# Patient Record
Sex: FEMALE | Race: WHITE | Employment: OTHER | URBAN - METROPOLITAN AREA
[De-identification: names, ages, dates, MRNs, and addresses within clinical notes are randomized per-mention and may not be internally consistent; named-entity substitution may affect disease eponyms.]

---

## 2017-04-19 ENCOUNTER — ONCOLOGY VISIT (OUTPATIENT)
Dept: ONCOLOGY | Facility: CLINIC | Age: 72
End: 2017-04-19
Attending: INTERNAL MEDICINE
Payer: MEDICARE

## 2017-04-19 VITALS
SYSTOLIC BLOOD PRESSURE: 109 MMHG | DIASTOLIC BLOOD PRESSURE: 70 MMHG | TEMPERATURE: 97 F | RESPIRATION RATE: 12 BRPM | HEART RATE: 98 BPM | OXYGEN SATURATION: 100 %

## 2017-04-19 DIAGNOSIS — C82.99 NODULAR LYMPHOMA OF EXTRANODAL AND/OR SOLID ORGAN SITE (H): Primary | ICD-10-CM

## 2017-04-19 DIAGNOSIS — C82.99 NODULAR LYMPHOMA OF EXTRANODAL AND/OR SOLID ORGAN SITE (H): ICD-10-CM

## 2017-04-19 DIAGNOSIS — J43.9 PULMONARY EMPHYSEMA, UNSPECIFIED EMPHYSEMA TYPE (H): ICD-10-CM

## 2017-04-19 DIAGNOSIS — D63.0 ANEMIA IN NEOPLASTIC DISEASE: ICD-10-CM

## 2017-04-19 LAB
ALBUMIN SERPL-MCNC: 3.8 G/DL (ref 3.4–5)
ALP SERPL-CCNC: 59 U/L (ref 40–150)
ALT SERPL W P-5'-P-CCNC: 12 U/L (ref 0–50)
ANION GAP SERPL CALCULATED.3IONS-SCNC: 3 MMOL/L (ref 3–14)
AST SERPL W P-5'-P-CCNC: 17 U/L (ref 0–45)
BASOPHILS # BLD AUTO: 0 10E9/L (ref 0–0.2)
BASOPHILS NFR BLD AUTO: 0.3 %
BILIRUB SERPL-MCNC: 0.4 MG/DL (ref 0.2–1.3)
BUN SERPL-MCNC: 22 MG/DL (ref 7–30)
CALCIUM SERPL-MCNC: 9.2 MG/DL (ref 8.5–10.1)
CHLORIDE SERPL-SCNC: 95 MMOL/L (ref 94–109)
CO2 SERPL-SCNC: 40 MMOL/L (ref 20–32)
CREAT SERPL-MCNC: 0.76 MG/DL (ref 0.52–1.04)
DIFFERENTIAL METHOD BLD: ABNORMAL
EOSINOPHIL # BLD AUTO: 0.1 10E9/L (ref 0–0.7)
EOSINOPHIL NFR BLD AUTO: 2.1 %
ERYTHROCYTE [DISTWIDTH] IN BLOOD BY AUTOMATED COUNT: 12.1 % (ref 10–15)
GFR SERPL CREATININE-BSD FRML MDRD: 75 ML/MIN/1.7M2
GLUCOSE SERPL-MCNC: 114 MG/DL (ref 70–99)
HCT VFR BLD AUTO: 34.9 % (ref 35–47)
HGB BLD-MCNC: 10.9 G/DL (ref 11.7–15.7)
IMM GRANULOCYTES # BLD: 0 10E9/L (ref 0–0.4)
IMM GRANULOCYTES NFR BLD: 0.3 %
LYMPHOCYTES # BLD AUTO: 0.5 10E9/L (ref 0.8–5.3)
LYMPHOCYTES NFR BLD AUTO: 14.2 %
MCH RBC QN AUTO: 31.9 PG (ref 26.5–33)
MCHC RBC AUTO-ENTMCNC: 31.2 G/DL (ref 31.5–36.5)
MCV RBC AUTO: 102 FL (ref 78–100)
MONOCYTES # BLD AUTO: 0.3 10E9/L (ref 0–1.3)
MONOCYTES NFR BLD AUTO: 7.7 %
NEUTROPHILS # BLD AUTO: 2.9 10E9/L (ref 1.6–8.3)
NEUTROPHILS NFR BLD AUTO: 75.4 %
NRBC # BLD AUTO: 0 10*3/UL
NRBC BLD AUTO-RTO: 0 /100
PLATELET # BLD AUTO: 82 10E9/L (ref 150–450)
POTASSIUM SERPL-SCNC: 4.4 MMOL/L (ref 3.4–5.3)
PROT SERPL-MCNC: 6.6 G/DL (ref 6.8–8.8)
RBC # BLD AUTO: 3.42 10E12/L (ref 3.8–5.2)
SODIUM SERPL-SCNC: 137 MMOL/L (ref 133–144)
URATE SERPL-MCNC: 4.4 MG/DL (ref 2.6–6)
WBC # BLD AUTO: 3.8 10E9/L (ref 4–11)

## 2017-04-19 PROCEDURE — 99214 OFFICE O/P EST MOD 30 MIN: CPT | Mod: ZP | Performed by: INTERNAL MEDICINE

## 2017-04-19 PROCEDURE — 85025 COMPLETE CBC W/AUTO DIFF WBC: CPT | Performed by: INTERNAL MEDICINE

## 2017-04-19 PROCEDURE — 00000402 ZZHCL STATISTIC TOTAL PROTEIN: Performed by: INTERNAL MEDICINE

## 2017-04-19 PROCEDURE — 84165 PROTEIN E-PHORESIS SERUM: CPT | Performed by: INTERNAL MEDICINE

## 2017-04-19 PROCEDURE — 36415 COLL VENOUS BLD VENIPUNCTURE: CPT | Performed by: INTERNAL MEDICINE

## 2017-04-19 PROCEDURE — 80053 COMPREHEN METABOLIC PANEL: CPT | Performed by: INTERNAL MEDICINE

## 2017-04-19 PROCEDURE — 84550 ASSAY OF BLOOD/URIC ACID: CPT | Performed by: INTERNAL MEDICINE

## 2017-04-19 PROCEDURE — 99212 OFFICE O/P EST SF 10 MIN: CPT | Mod: ZF

## 2017-04-19 ASSESSMENT — PAIN SCALES - GENERAL: PAINLEVEL: NO PAIN (0)

## 2017-04-19 NOTE — LETTER
"4/19/2017      RE: Michelle Baker  [address] [address]       HISTORY OF PRESENT ILLNESS:  Michelle Baker returns today in followup of her diagnosis of follicular lymphoma.  The patient is a 72-year-old woman diagnosed with stage IVB disease in 10/2011.  At that time, she had scattered nodes, a large abdominal mass, and displacement plus involvement of the right kidney, musculoskeletal and marrow disease.  She has received a number of therapies including as outlined in my note of 11/16/2016.  Her most recent therapy was with R-bendamustine beginning in 02/2014.  After 4 cycles she had an excellent response per CT scan.  She received a fifth cycle and then treatment was discontinued because of the onset of neutropenia \"attributed to rituximab.\"       Ms. Baker also has significant chronic obstructive pulmonary disease for which she is being seen at home and is on inhalers plus home O2.  Her respiratory status is stable.  She is breathing comfortably most of the time with the O2.  However, she does have occasional \"panic attacks.\" Overall, she is coping quite well.       Ms. Baker was last seen in my clinic on 11/16/2016.  In the interval, she indicates that she has had no significant infections. She has had no fevers or night sweats and her weight is stable.  Her appetite is good and food appeals.  She has no nausea, vomiting or constipation/diarrhea.  She has had no bleediing.      REVIEW OF SYSTEMS:  A 10-point review of systems is otherwise negative.       MEDICATIONS:  Reviewed and no changes according to the patient.      ALLERGIES:  None reported.      PHYSICAL EXAMINATION:   VITAL SIGNS:  Weight - pending, blood pressure 109/70, pulse  and regular, respirations 12-18, temperature 97.0, O2 saturation 100% on inhaled O2.    GENERAL:  The patient is sitting in a wheelchair comfortably.  She is very active and alert.  She is somewhat anxious as usual.    HEENT:  Anicteric.  Oropharynx - no " masses.  Mucosa - moist.  No lesions.   NECK:  No cervical or supraclavicular adenopathy.   CHEST:  Reduced air entry.  No adventitious sounds.  No dullness to percussion.   AXILLAE:  No nodes.   HEART:  Regular rhythm.  Late systolic murmur heard at the apex.  Heart sounds are distant.  Regular rhythm.   ABDOMEN:  Soft.  No detectable organomegaly or mass.  No tenderness.  Bowel sounds present.  No inguinal nodes.   EXTREMITIES:  No edema.   SKIN:  No rashes.       LABORATORY:     Hemoglobin 10.9 grams percent with 3,800 WBCs (75% neutrophils, 14% lymphocytes) and 82,000 platelets.     Electrolytes - normal with the exception of an elevated bicarbonate of 40.  Creatinine 0.76.     Calcium 9.2.  Uric acid 4.4.     Liver enzymes - normal.    Glucose 114 (non-fasting).      IMAGING:  Chest, abdominal and pelvic CT scan - Insert.       ASSESSMENT AND PLAN:     1.  Follicular lymphoma (grade 1), stage IVB - The patient continues to do relatively well after her most recent treatment with rituximab plus bendamustine ending in 06/2014.  On physical examination and by CT scan, she has no evidence of recurrent disease at this time.  We will continue to see the patient at 3-4-month intervals and determine the appropriate interval for reimaging in the future.    2.  Anemia.  The patient's hemoglobin is down slightly from before.  It is macrocytic with an MCV of 102.  It does not appear that she is losing blood and has no suggestion of hemolysis in the past.  We plan to have the CBC repeated in approximately 1 month and will make additional determinations at that time.       Return visit is scheduled for early August.  Laboratory studies are ordered.     Keith Ospina MD  Professor of Medicine  Oncology  [facility]  Office: [phone]  Clinic Fax: [phone]       cc:  Laurie Rivera, EVELYN Ospina MD

## 2017-04-19 NOTE — MR AVS SNAPSHOT
After Visit Summary   4/19/2017    Michelle Baker    MRN: [id number]           Patient Information     Date Of Birth          1945        Visit Information        Provider Department      4/19/2017 4:00 PM Keith Ospina MD [facility]        Today's Diagnoses     Nodular lymphoma of extranodal and/or solid organ site (H)    -  1    Anemia in neoplastic disease          Care Instructions    Blood test at home in about 1 month,        Follow-ups after your visit        Follow-up notes from your care team     Return in about 4 months (around 8/10/2017) for Physical Exam, Lab Work.      Your next 10 appointments already scheduled     Aug 16, 2017 12:30 PM CDT   [facility] Draw with  [facility]   [facility] Lab Draw ([facility])    [address] [place] [id number]   [phone]            Aug 16, 2017  1:00 PM CDT   (Arrive by 12:45 PM)   Return Visit with Keith Ospina MD   [facility] ([facility])    [address] [place] [id number] [phone]              Future tests that were ordered for you today     Open Future Orders        Priority Expected Expires Ordered    Haptoglobin Routine 8/3/2017 4/19/2018 4/19/2017    Ferritin Routine 8/3/2017 4/19/2018 4/19/2017    Direct antiglobulin test Routine 8/3/2017 4/19/2018 4/19/2017    *CBC with platelets differential Routine 8/3/2017 4/19/2018 4/19/2017    Reticulocyte count Routine 8/3/2017 4/19/2018 4/19/2017    Comprehensive metabolic panel Routine 8/3/2017 4/19/2018 4/19/2017    Lactate Dehydrogenase Routine 8/3/2017 4/19/2018 4/19/2017    Protein electrophoresis Routine 8/3/2017 4/19/2018 4/19/2017    Folate Routine 8/3/2017 4/19/2018 4/19/2017    Vitamin B12 Routine 8/3/2017 4/19/2018 4/19/2017            Who to contact     If you have questions or need follow up information about  "today's clinic visit or your schedule please contact [facility] directly at [phone].  Normal or non-critical lab and imaging results will be communicated to you by [email or web address], letter or phone within 4 business days after the clinic has received the results. If you do not hear from us within 7 days, please contact the clinic through [email or web address] or phone. If you have a critical or abnormal lab result, we will notify you by phone as soon as possible.  Submit refill requests through [employer] or call your pharmacy and they will forward the refill request to us. Please allow 3 business days for your refill to be completed.          Additional Information About Your Visit        [email or web address] Information     [employer] lets you send messages to your doctor, view your test results, renew your prescriptions, schedule appointments and more. To sign up, go to [email or web address] . Click on \"Log in\" on the left side of the screen, which will take you to the Welcome page. Then click on \"Sign up Now\" on the right side of the page.     You will be asked to enter the access code listed below, as well as some personal information. Please follow the directions to create your username and password.     Your access code is: [email or web address]  Expires: 2017  6:30 AM     Your access code will  in 90 days. If you need help or a new code, please call your [facility] clinic or [phone].        Care EveryWhere ID     This is your Care EveryWhere ID. This could be used by other organizations to access your [facility] medical records  [id number]        Your Vitals Were     Pulse Temperature Respirations Pulse Oximetry          98 97  F (36.1  C) (Oral) 12 100%         Blood Pressure from Last 3 Encounters:   17 109/70   16 129/82   16 115/73    Weight from Last 3 Encounters:   16 34.7 kg (76 lb 9.6 oz)   16 34.6 kg (76 lb 4.8 oz)   16 35.2 kg (77 lb 8 oz)               Primary Care " Provider Office Phone # Fax #    Shlomo Rivera [phone]       [facility] [address] [facility] [address]        Thank you!     Thank you for choosing [facility]  for your care. Our goal is always to provide you with excellent care. Hearing back from our patients is one way we can continue to improve our services. Please take a few minutes to complete the written survey that you may receive in the mail after your visit with us. Thank you!             Your Updated Medication List - Protect others around you: Learn how to safely use, store and throw away your medicines at www.disposemymeds.org.          This list is accurate as of: 4/19/17  6:55 PM.  Always use your most recent med list.                   Brand Name Dispense Instructions for use    ALBUTEROL IN      Inhale 2 puffs into the lungs 4 times daily       Calcium-Vitamin D 600-200 MG-UNIT Tabs      Take 1 tablet by mouth daily.       PAXIL PO      Take 10 mg by mouth daily       SPIRIVA HANDIHALER IN      Inhale 1 puff into the lungs daily       TYLENOL 500 MG tablet   Generic drug:  acetaminophen      Take 500-1,000 mg by mouth every 6 hours as needed for mild pain Reported on 4/19/2017

## 2017-04-19 NOTE — LETTER
"4/19/2017      RE: Michelle Baker  [address] [address]       HISTORY OF PRESENT ILLNESS:  Michelle Baker returns today in followup of her diagnosis of follicular lymphoma.  The patient is a 72-year-old woman diagnosed with stage IVB disease in 10/2011.  At that time, she had scattered nodes, a large abdominal mass, and displacement plus involvement of the right kidney, musculoskeletal and marrow disease.  She has received a number of therapies including as outlined in my note of 11/16/2016.  Her most recent therapy was with R-bendamustine beginning in 02/2014.  After 4 cycles she had an excellent response per CT scan.  She received a fifth cycle and then treatment was discontinued because of the onset of neutropenia \"attributed to rituximab.\"       Ms. Baker also has significant chronic obstructive pulmonary disease for which she is being seen at home and is on inhalers plus home O2.  Her respiratory status is stable.  She is breathing comfortably most of the time with the O2.  However, she does have occasional \"panic attacks.\" Overall, she is coping quite well.       Ms. Baker was last seen in my clinic on 11/16/2016.  In the interval, she indicates that she has had no significant infections. She has had no fevers or night sweats and her weight is stable.  Her appetite is good and food appeals.  She has no nausea, vomiting or constipation/diarrhea.  She has had no bleediing.      REVIEW OF SYSTEMS:  A 10-point review of systems is otherwise negative.       MEDICATIONS:  Reviewed and no changes according to the patient.      ALLERGIES:  None reported.      PHYSICAL EXAMINATION:   VITAL SIGNS:  Weight - pending, blood pressure 109/70, pulse  and regular, respirations 12-18, temperature 97.0, O2 saturation 100% on inhaled O2.    GENERAL:  The patient is sitting in a wheelchair comfortably.  She is very active and alert.  She is somewhat anxious as usual.    HEENT:  Anicteric.  Oropharynx - no " masses.  Mucosa - moist.  No lesions.   NECK:  No cervical or supraclavicular adenopathy.   CHEST:  Reduced air entry.  No adventitious sounds.  No dullness to percussion.   AXILLAE:  No nodes.   HEART:  Regular rhythm.  Late systolic murmur heard at the apex.  Heart sounds are distant.  Regular rhythm.   ABDOMEN:  Soft.  No detectable organomegaly or mass.  No tenderness.  Bowel sounds present.  No inguinal nodes.   EXTREMITIES:  No edema.   SKIN:  No rashes.       LABORATORY:     Hemoglobin 10.9 grams percent with 3,800 WBCs (75% neutrophils, 14% lymphocytes) and 82,000 platelets.     Electrolytes - normal with the exception of an elevated bicarbonate of 40.  Creatinine 0.76.     Calcium 9.2.  Uric acid 4.4.     Liver enzymes - normal.    Glucose 114 (non-fasting).      IMAGING:  Chest, abdominal and pelvic CT scan -     1. Nonspecific hazy infiltration of the central mesentery and retroperitoneal fat not significantly changed compared to 5/19/2016. No abnormal lymphadenopathy in the chest, abdomen, or pelvis.  2. Severe degenerative changes of the hips.   3. Emphysema.     ASSESSMENT AND PLAN:     1.  Follicular lymphoma (grade 1), stage IVB - The patient continues to do relatively well after her most recent treatment with rituximab plus bendamustine ending in 06/2014.  On physical examination and by CT scan, she has no evidence of recurrent disease at this time.  We will continue to see the patient at 3-4-month intervals and determine the appropriate interval for reimaging in the future.    2.  Anemia.  The patient's hemoglobin is down slightly from before.  It is macrocytic with an MCV of 102.  It does not appear that she is losing blood and has no suggestion of hemolysis in the past.  We plan to have the CBC repeated in approximately 1 month and will make additional determinations at that time.       Return visit is scheduled for early August.  Laboratory studies are ordered.     Keith Ospina,  MD  Professor of Medicine  Oncology  [facility]  Office: [phone]  Clinic Fax: [phone]       cc:  EVELYN Tovar MD

## 2017-04-19 NOTE — PROGRESS NOTES
"HISTORY OF PRESENT ILLNESS:  Michelle Baker returns today in followup of her diagnosis of follicular lymphoma.  The patient is a 72-year-old woman diagnosed with stage IVB disease in 10/2011.  At that time, she had scattered nodes, a large abdominal mass, and displacement plus involvement of the right kidney, musculoskeletal and marrow disease.  She has received a number of therapies including as outlined in my note of 11/16/2016.  Her most recent therapy was with R-bendamustine beginning in 02/2014.  After 4 cycles she had an excellent response per CT scan.  She received a fifth cycle and then treatment was discontinued because of the onset of neutropenia \"attributed to rituximab.\"       Ms. Baker also has significant chronic obstructive pulmonary disease for which she is being seen at home and is on inhalers plus home O2.  Her respiratory status is stable.  She is breathing comfortably most of the time with the O2.  However, she does have occasional \"panic attacks.\" Overall, she is coping quite well.       Ms. Baker was last seen in my clinic on 11/16/2016.  In the interval, she indicates that she has had no significant infections. She has had no fevers or night sweats and her weight is stable.  Her appetite is good and food appeals.  She has no nausea, vomiting or constipation/diarrhea.  She has had no bleediing.      REVIEW OF SYSTEMS:  A 10-point review of systems is otherwise negative.       MEDICATIONS:  Reviewed and no changes according to the patient.      ALLERGIES:  None reported.      PHYSICAL EXAMINATION:   VITAL SIGNS:  Weight - pending, blood pressure 109/70, pulse  and regular, respirations 12-18, temperature 97.0, O2 saturation 100% on inhaled O2.    GENERAL:  The patient is sitting in a wheelchair comfortably.  She is very active and alert.  She is somewhat anxious as usual.    HEENT:  Anicteric.  Oropharynx - no masses.  Mucosa - moist.  No lesions.   NECK:  No cervical or supraclavicular " adenopathy.   CHEST:  Reduced air entry.  No adventitious sounds.  No dullness to percussion.   AXILLAE:  No nodes.   HEART:  Regular rhythm.  Late systolic murmur heard at the apex.  Heart sounds are distant.  Regular rhythm.   ABDOMEN:  Soft.  No detectable organomegaly or mass.  No tenderness.  Bowel sounds present.  No inguinal nodes.   EXTREMITIES:  No edema.   SKIN:  No rashes.       LABORATORY:     Hemoglobin 10.9 grams percent with 3,800 WBCs (75% neutrophils, 14% lymphocytes) and 82,000 platelets.     Electrolytes - normal with the exception of an elevated bicarbonate of 40.  Creatinine 0.76.     Calcium 9.2.  Uric acid 4.4.     Liver enzymes - normal.    Glucose 114 (non-fasting).      IMAGING:  Chest, abdominal and pelvic CT scan -     1. Nonspecific hazy infiltration of the central mesentery and retroperitoneal fat not significantly changed compared to 5/19/2016. No abnormal lymphadenopathy in the chest, abdomen, or pelvis.  2. Severe degenerative changes of the hips.   3. Emphysema.     ASSESSMENT AND PLAN:     1.  Follicular lymphoma (grade 1), stage IVB - The patient continues to do relatively well after her most recent treatment with rituximab plus bendamustine ending in 06/2014.  On physical examination and by CT scan, she has no evidence of recurrent disease at this time.  We will continue to see the patient at 3-4-month intervals and determine the appropriate interval for reimaging in the future.    2.  Anemia.  The patient's hemoglobin is down slightly from before.  It is macrocytic with an MCV of 102.  It does not appear that she is losing blood and has no suggestion of hemolysis in the past.  We plan to have the CBC repeated in approximately 1 month and will make additional determinations at that time.       Return visit is scheduled for early August.  Laboratory studies are ordered.     Keith Ospina MD  Professor of Medicine  Oncology  [facility]  Office:  [phone]  Clinic Fax: [phone]       cc:  Laurie Rivera, CNP

## 2017-04-19 NOTE — NURSING NOTE
"Michelle Baker is a 72 year old female who presents for:  Chief Complaint   Patient presents with     Oncology Clinic Visit     f/u nhl, ct scan and lab work        Initial Vitals:  /70  Pulse 98  Temp 97  F (36.1  C) (Oral)  Resp 12  SpO2 100% Estimated body mass index is 15.47 kg/(m^2) as calculated from the following:    Height as of 8/18/16: 1.499 m (4' 11\").    Weight as of 11/16/16: 34.7 kg (76 lb 9.6 oz).. There is no height or weight on file to calculate BSA. BP completed using cuff size: small regular  No Pain (0) No LMP recorded. Patient has had a hysterectomy. Allergies and medications reviewed.     Medications: Medication refills not needed today.  Pharmacy name entered into EPIC:    [facility] [place], [place] - [address] [facility] - [place], [place] - [address] [place] [address] [id number] - [place], [place] - [address] [place] [address]    Comments: pt denies pain    5 minutes for nursing intake (face to face time)   Jeff Gar CMA        "

## 2017-04-20 LAB
ALBUMIN SERPL ELPH-MCNC: 4.1 G/DL (ref 3.7–5.1)
ALPHA1 GLOB SERPL ELPH-MCNC: 0.3 G/DL (ref 0.2–0.4)
ALPHA2 GLOB SERPL ELPH-MCNC: 0.9 G/DL (ref 0.5–0.9)
B-GLOBULIN SERPL ELPH-MCNC: 0.5 G/DL (ref 0.6–1)
GAMMA GLOB SERPL ELPH-MCNC: 0.5 G/DL (ref 0.7–1.6)
M PROTEIN SERPL ELPH-MCNC: 0 G/DL
PROT PATTERN SERPL ELPH-IMP: ABNORMAL

## 2017-04-24 DIAGNOSIS — D63.0 ANEMIA IN NEOPLASTIC DISEASE: ICD-10-CM

## 2017-04-24 DIAGNOSIS — C82.99 NODULAR LYMPHOMA OF EXTRANODAL AND/OR SOLID ORGAN SITE (H): Primary | ICD-10-CM

## 2017-05-30 ENCOUNTER — TRANSFERRED RECORDS (OUTPATIENT)
Dept: HEALTH INFORMATION MANAGEMENT | Facility: CLINIC | Age: 72
End: 2017-05-30

## 2017-05-30 LAB
BASOPHILS # BLD AUTO: 0.02 10*3/UL
BASOPHILS NFR BLD AUTO: 0.5 %
EOSINOPHIL # BLD AUTO: 0.17 10*3/UL
EOSINOPHIL NFR BLD AUTO: 4.5 %
ERYTHROCYTE [DISTWIDTH] IN BLOOD BY AUTOMATED COUNT: 12.3 %
HCT VFR BLD AUTO: 38.6 %
HEMOGLOBIN: 11.8 G/DL (ref 11.7–15.7)
LYMPHOCYTES # BLD AUTO: 0.88 10*3/UL
LYMPHOCYTES NFR BLD AUTO: 23.3 %
MCH RBC QN AUTO: 30.8 PG
MCHC RBC AUTO-ENTMCNC: 30.6 G/DL
MCV RBC AUTO: 100.8 FL
MONOCYTES # BLD AUTO: 0.47 10*3/UL
MONOCYTES NFR BLD AUTO: 12.5 %
NEUTROPHILS # BLD AUTO: 2.23 10*3/UL
NEUTROPHILS NFR BLD AUTO: 59.2 %
PLATELET COUNT - QUEST: 97 10^9/L (ref 150–450)
RBC # BLD AUTO: 3.83 10^12/L
WBC # BLD AUTO: 3.8 10^9/L

## 2017-06-07 ENCOUNTER — CARE COORDINATION (OUTPATIENT)
Dept: ONCOLOGY | Facility: CLINIC | Age: 72
End: 2017-06-07

## 2017-06-07 NOTE — PROGRESS NOTES
Called patient per the request of Dr. Ospina to review with her the labs that were done at the end of May on 5/30/17.  Informed her that her Hemoglobin went from 10.9 to 11.8 and that per Dr. Ospina she does not need to have any more labs drawn until she RTC in August to see him.  Patient verbalized understanding and was grateful for the update. Encouraged patient to call into our triage line with any questions, comments, or concerns that she may have.

## 2017-08-16 ENCOUNTER — ONCOLOGY VISIT (OUTPATIENT)
Dept: ONCOLOGY | Facility: CLINIC | Age: 72
End: 2017-08-16
Attending: INTERNAL MEDICINE
Payer: MEDICARE

## 2017-08-16 ENCOUNTER — APPOINTMENT (OUTPATIENT)
Dept: LAB | Facility: CLINIC | Age: 72
End: 2017-08-16
Attending: INTERNAL MEDICINE
Payer: MEDICARE

## 2017-08-16 VITALS
TEMPERATURE: 98 F | SYSTOLIC BLOOD PRESSURE: 114 MMHG | WEIGHT: 76.2 LBS | BODY MASS INDEX: 15.36 KG/M2 | OXYGEN SATURATION: 97 % | HEIGHT: 59 IN | HEART RATE: 110 BPM | DIASTOLIC BLOOD PRESSURE: 67 MMHG | RESPIRATION RATE: 16 BRPM

## 2017-08-16 DIAGNOSIS — D63.0 ANEMIA IN NEOPLASTIC DISEASE: Primary | ICD-10-CM

## 2017-08-16 DIAGNOSIS — C82.99 NODULAR LYMPHOMA OF EXTRANODAL AND/OR SOLID ORGAN SITE (H): ICD-10-CM

## 2017-08-16 LAB
ALBUMIN SERPL-MCNC: 3.8 G/DL (ref 3.4–5)
ALP SERPL-CCNC: 71 U/L (ref 40–150)
ALT SERPL W P-5'-P-CCNC: 18 U/L (ref 0–50)
ANION GAP SERPL CALCULATED.3IONS-SCNC: 8 MMOL/L (ref 3–14)
AST SERPL W P-5'-P-CCNC: 19 U/L (ref 0–45)
BASOPHILS # BLD AUTO: 0 10E9/L (ref 0–0.2)
BASOPHILS NFR BLD AUTO: 0.2 %
BILIRUB SERPL-MCNC: 0.4 MG/DL (ref 0.2–1.3)
BUN SERPL-MCNC: 21 MG/DL (ref 7–30)
CALCIUM SERPL-MCNC: 9.7 MG/DL (ref 8.5–10.1)
CHLORIDE SERPL-SCNC: 97 MMOL/L (ref 94–109)
CO2 SERPL-SCNC: 32 MMOL/L (ref 20–32)
CREAT SERPL-MCNC: 0.69 MG/DL (ref 0.52–1.04)
DAT POLY-SP REAG RBC QL: NORMAL
DIFFERENTIAL METHOD BLD: ABNORMAL
EOSINOPHIL # BLD AUTO: 0.1 10E9/L (ref 0–0.7)
EOSINOPHIL NFR BLD AUTO: 1.5 %
ERYTHROCYTE [DISTWIDTH] IN BLOOD BY AUTOMATED COUNT: 12.4 % (ref 10–15)
FERRITIN SERPL-MCNC: 89 NG/ML (ref 8–252)
FOLATE SERPL-MCNC: 25.5 NG/ML
GFR SERPL CREATININE-BSD FRML MDRD: 84 ML/MIN/1.7M2
GLUCOSE SERPL-MCNC: 119 MG/DL (ref 70–99)
HCT VFR BLD AUTO: 37.5 % (ref 35–47)
HGB BLD-MCNC: 11.8 G/DL (ref 11.7–15.7)
IMM GRANULOCYTES # BLD: 0 10E9/L (ref 0–0.4)
IMM GRANULOCYTES NFR BLD: 0.4 %
LDH SERPL L TO P-CCNC: 173 U/L (ref 81–234)
LYMPHOCYTES # BLD AUTO: 0.7 10E9/L (ref 0.8–5.3)
LYMPHOCYTES NFR BLD AUTO: 13.5 %
MCH RBC QN AUTO: 31.1 PG (ref 26.5–33)
MCHC RBC AUTO-ENTMCNC: 31.5 G/DL (ref 31.5–36.5)
MCV RBC AUTO: 99 FL (ref 78–100)
MONOCYTES # BLD AUTO: 0.4 10E9/L (ref 0–1.3)
MONOCYTES NFR BLD AUTO: 8.2 %
NEUTROPHILS # BLD AUTO: 4 10E9/L (ref 1.6–8.3)
NEUTROPHILS NFR BLD AUTO: 76.2 %
NRBC # BLD AUTO: 0 10*3/UL
NRBC BLD AUTO-RTO: 0 /100
PLATELET # BLD AUTO: 94 10E9/L (ref 150–450)
POTASSIUM SERPL-SCNC: 4.9 MMOL/L (ref 3.4–5.3)
PROT SERPL-MCNC: 7.2 G/DL (ref 6.8–8.8)
RBC # BLD AUTO: 3.79 10E12/L (ref 3.8–5.2)
RETICS # AUTO: 30.3 10E9/L (ref 25–95)
RETICS/RBC NFR AUTO: 0.8 % (ref 0.5–2)
SODIUM SERPL-SCNC: 137 MMOL/L (ref 133–144)
VIT B12 SERPL-MCNC: 358 PG/ML (ref 193–986)
WBC # BLD AUTO: 5.2 10E9/L (ref 4–11)

## 2017-08-16 PROCEDURE — 36415 COLL VENOUS BLD VENIPUNCTURE: CPT

## 2017-08-16 PROCEDURE — 84165 PROTEIN E-PHORESIS SERUM: CPT | Performed by: INTERNAL MEDICINE

## 2017-08-16 PROCEDURE — 99214 OFFICE O/P EST MOD 30 MIN: CPT | Mod: ZP | Performed by: INTERNAL MEDICINE

## 2017-08-16 PROCEDURE — 85045 AUTOMATED RETICULOCYTE COUNT: CPT | Performed by: INTERNAL MEDICINE

## 2017-08-16 PROCEDURE — 82746 ASSAY OF FOLIC ACID SERUM: CPT | Performed by: INTERNAL MEDICINE

## 2017-08-16 PROCEDURE — 83010 ASSAY OF HAPTOGLOBIN QUANT: CPT | Performed by: INTERNAL MEDICINE

## 2017-08-16 PROCEDURE — 80053 COMPREHEN METABOLIC PANEL: CPT | Performed by: INTERNAL MEDICINE

## 2017-08-16 PROCEDURE — 82607 VITAMIN B-12: CPT | Performed by: INTERNAL MEDICINE

## 2017-08-16 PROCEDURE — 82728 ASSAY OF FERRITIN: CPT | Performed by: INTERNAL MEDICINE

## 2017-08-16 PROCEDURE — 86880 COOMBS TEST DIRECT: CPT | Performed by: INTERNAL MEDICINE

## 2017-08-16 PROCEDURE — 00000402 ZZHCL STATISTIC TOTAL PROTEIN: Performed by: INTERNAL MEDICINE

## 2017-08-16 PROCEDURE — 99213 OFFICE O/P EST LOW 20 MIN: CPT | Mod: ZF

## 2017-08-16 PROCEDURE — 85025 COMPLETE CBC W/AUTO DIFF WBC: CPT | Performed by: INTERNAL MEDICINE

## 2017-08-16 PROCEDURE — 83615 LACTATE (LD) (LDH) ENZYME: CPT | Performed by: INTERNAL MEDICINE

## 2017-08-16 ASSESSMENT — PAIN SCALES - GENERAL: PAINLEVEL: NO PAIN (0)

## 2017-08-16 NOTE — LETTER
"8/16/2017      RE: Michelle Baker  [address] [address]       ONCOLOGY SUMMARY:  Michelle Baker is a 72-year-old woman diagnosed with stage IVB follicular lymphoma in 10/2011.  At that time, she had scattered nodes, a large abdominal mass, and displacement plus involvement of the right kidney, musculoskeletal and marrow disease.  She has received a number of therapies as outlined in my note of 11/16/2016.  Her most recent therapy was with R-bendamustine beginning in 02/2014.  After 4 cycles she had an excellent response per CT scan.  She received a fifth cycle and then treatment was discontinued because of the onset of neutropenia \"attributed to rituximab.\"        Ms. Baker also has significant chronic obstructive pulmonary disease for which she is on inhalers plus home O2.  Her respiratory status is stable.  She is breathing comfortably most of the time with the O2.       INTERVAL HISTORY:  The patient was last seen in my clinic on 4/19/2017.  She indicates that she has been doing quite well \"for her\" since that time.  She continues to use home O2 as well as her inhalers.  Her respiratory status remains relatively stable, but is her limiting factor.  Her appetite is \"not bad.\"  She has difficulty gaining weight, but is holding her own.  She has no nausea or vomiting, no bowel complaints.       She has also had no interval infections.  She has had no notable fevers, sweats or new onset pain.       REVIEW OF SYSTEMS:  A 10-point review of systems is negative.      MEDICATIONS:   1.  Spiriva inhaler.   2.  Albuterol inhaler.     3.  Paroxetine.   4.  Acetaminophen p.r.n.   5.  Calcium plus vitamin D.      ALLERGIES:  None reported.      PHYSICAL EXAMINATION:   VITAL SIGNS:  Weight 34.6 kg (stable), blood pressure 114/67, pulse , respirations 16, temperature 98.0.  O2 saturation is 97% on O2.    HEENT:  Anicteric.  No conjunctival injection.  Oropharynx - no masses.  Mucosa - moist and without " lesion.   NECK:  No evident adenopathy.   CHEST:  Limited air entry.  No rales or rub.  No dullness to percussion.   AXILLAE:  No nodes.    HEART:  Regular rhythm with a systolic murmur heard best at the apex.  Somewhat distant heart sounds.  No gallop appreciated.  Regular rhythm.    ABDOMEN:  Soft and nontender.  No organomegaly or mass.  No evident inguinal nodes.   EXTREMITIES:  No edema.   SKIN:  No ecchymoses, petechiae or rash.      LABORATORY:     Hemoglobin 11.8 grams percent with 5,200 WBCs (76% neutrophils, 15% lymphocytes) and 94,000 platelets. Retics 0.8%.  Electrolytes, calcium, creatinine (0.69) - normal.    Liver enzymes, including serum LDH - normal.     Ferritin 89.  Haptoglobin 90.  Vitamin B12 358 (normal), folate 25.5 (normal).    LUPE negative.     Serum protein electrophoresis - albumin 4.5, gamma fraction 0.6, no monoclonal peak.       ASSESSMENT AND PLAN:  Follicular lymphoma (grade 1), stage IVB - MsCarlos Baker is now approaching 6 years from the time of diagnosis and is just over 3 years following her fifth and final cycle of rituximab plus bendamustine.  There is no evidence of recurrent disease on today's evaluation.  Her most recent surveillance CT scan was on 04/19/2017 and was negative.  It did show emphysema.       Return to clinic in 3-4 months.  Will determine at that visit the appropriate interval for reimaging.      Anemia appears to have improved and stabilized.  Also, the workup is essentially negative.  Continue to monitor as she returns to clinic.  Modest thrombocytopenia (stable).  Mild hypogammaglobulimia (stable).     The patient is a candidate for the influenza vaccine when it becomes available this fall.    Keith Ospina MD  Professor of Medicine  Oncology  [facility]  Office: [phone]  Clinic Fax: [phone]         Cc:  EVELYN Tovar MD

## 2017-08-16 NOTE — NURSING NOTE
"Oncology Rooming Note    August 16, 2017 12:37 PM   Michelle Baker is a 72 year old female who presents for:    Chief Complaint   Patient presents with     Blood Draw     VENIPUNCTURE IN RIGHT ARM, VITALS OBTAINED     Oncology Clinic Visit     Return visit related to NHL     Initial Vitals: /67 (BP Location: Right arm, Patient Position: Sitting, Cuff Size: Adult Small)  Pulse 110  Temp 98  F (36.7  C) (Oral)  Resp 16  Ht 1.499 m (4' 11.02\")  Wt 34.6 kg (76 lb 3.2 oz)  SpO2 97%  BMI 15.38 kg/m2 Estimated body mass index is 15.38 kg/(m^2) as calculated from the following:    Height as of this encounter: 1.499 m (4' 11.02\").    Weight as of this encounter: 34.6 kg (76 lb 3.2 oz). Body surface area is 1.2 meters squared.  No Pain (0) Comment: Data Unavailable   No LMP recorded. Patient has had a hysterectomy.  Allergies reviewed: Yes  Medications reviewed: Yes    Medications: Medication refills not needed today.  Pharmacy name entered into EPIC:    [facility] [place] [place] [facility] [address] [facility] - [place], [place] - [address] [facility] - [place], [place] - [address] [place] [address]    Clinical concerns: No new concerns. Provider was NOT notified.    10 minutes for nursing intake (face to face time)     Claudetet Ivey LPN            "

## 2017-08-16 NOTE — NURSING NOTE
Chief Complaint   Patient presents with     Blood Draw     VENIPUNCTURE IN RIGHT ARM, VITALS OBTAINED     Pat Thompson CMA

## 2017-08-16 NOTE — PROGRESS NOTES
"ONCOLOGY SUMMARY:  Michelle Baker is a 72-year-old woman diagnosed with stage IVB follicular lymphoma in 10/2011.  At that time, she had scattered nodes, a large abdominal mass, and displacement plus involvement of the right kidney, musculoskeletal and marrow disease.  She has received a number of therapies as outlined in my note of 11/16/2016.  Her most recent therapy was with R-bendamustine beginning in 02/2014.  After 4 cycles she had an excellent response per CT scan.  She received a fifth cycle and then treatment was discontinued because of the onset of neutropenia \"attributed to rituximab.\"        Ms. Baker also has significant chronic obstructive pulmonary disease for which she is on inhalers plus home O2.  Her respiratory status is stable.  She is breathing comfortably most of the time with the O2.       INTERVAL HISTORY:  The patient was last seen in my clinic on 4/19/2017.  She indicates that she has been doing quite well \"for her\" since that time.  She continues to use home O2 as well as her inhalers.  Her respiratory status remains relatively stable, but is her limiting factor.  Her appetite is \"not bad.\"  She has difficulty gaining weight, but is holding her own.  She has no nausea or vomiting, no bowel complaints.       She has also had no interval infections.  She has had no notable fevers, sweats or new onset pain.       REVIEW OF SYSTEMS:  A 10-point review of systems is negative.      MEDICATIONS:   1.  Spiriva inhaler.   2.  Albuterol inhaler.     3.  Paroxetine.   4.  Acetaminophen p.r.n.   5.  Calcium plus vitamin D.      ALLERGIES:  None reported.      PHYSICAL EXAMINATION:   VITAL SIGNS:  Weight 34.6 kg (stable), blood pressure 114/67, pulse , respirations 16, temperature 98.0.  O2 saturation is 97% on O2.    HEENT:  Anicteric.  No conjunctival injection.  Oropharynx - no masses.  Mucosa - moist and without lesion.   NECK:  No evident adenopathy.   CHEST:  Limited air entry.  No rales or " rub.  No dullness to percussion.   AXILLAE:  No nodes.    HEART:  Regular rhythm with a systolic murmur heard best at the apex.  Somewhat distant heart sounds.  No gallop appreciated.  Regular rhythm.    ABDOMEN:  Soft and nontender.  No organomegaly or mass.  No evident inguinal nodes.   EXTREMITIES:  No edema.   SKIN:  No ecchymoses, petechiae or rash.      LABORATORY:     Hemoglobin 11.8 grams percent with 5,200 WBCs (76% neutrophils, 15% lymphocytes) and 94,000 platelets. Retics 0.8%.  Electrolytes, calcium, creatinine (0.69) - normal.    Liver enzymes, including serum LDH - normal.     Ferritin 89.  Haptoglobin 90.  Vitamin B12 358 (normal), folate 25.5 (normal).    LUPE negative.     Serum protein electrophoresis - albumin 4.5, gamma fraction 0.6, no monoclonal peak.       ASSESSMENT AND PLAN:  Follicular lymphoma (grade 1), stage IVB - MsCarlos Baker is now approaching 6 years from the time of diagnosis and is just over 3 years following her fifth and final cycle of rituximab plus bendamustine.  There is no evidence of recurrent disease on today's evaluation.  Her most recent surveillance CT scan was on 04/19/2017 and was negative.  It did show emphysema.       Return to clinic in 3-4 months.  Will determine at that visit the appropriate interval for reimaging.      Anemia appears to have improved and stabilized.  Also, the workup is essentially negative.  Continue to monitor as she returns to clinic.  Modest thrombocytopenia (stable).  Mild hypogammaglobulimia (stable).     The patient is a candidate for the influenza vaccine when it becomes available this fall.    Keith Ospina MD  Professor of Medicine  Oncology  [facility]  Office: [phone]  Clinic Fax: [phone]         Cc:  Laurie Rivera, EVELYN

## 2017-08-16 NOTE — MR AVS SNAPSHOT
After Visit Summary   8/16/2017    Michelle Baker    MRN: [id number]           Patient Information     Date Of Birth          1945        Visit Information        Provider Department      8/16/2017 1:00 PM Keith Ospina MD [facility]        Today's Diagnoses     Anemia in neoplastic disease    -  1    Nodular lymphoma of extranodal and/or solid organ site (H)           Follow-ups after your visit        Follow-up notes from your care team     Return in about 3 months (around 11/16/2017) for Physical Exam, Lab Work.      Your next 10 appointments already scheduled     Nov 15, 2017 12:30 PM CST   [facility] Lab Draw with  [facility] LAB DRAW   [facility] Draw ([facility])    [address] [place] [id number] [phone]            Nov 15, 2017  1:00 PM CST   (Arrive by 12:45 PM)   Return Visit with Keith Ospina MD   [facility] ([facility])    [address] [place] [id number] [phone]              Who to contact     If you have questions or need follow up information about today's clinic visit or your schedule please contact [facility] directly at [phone].  Normal or non-critical lab and imaging results will be communicated to you by [employer], letter or phone within 4 business days after the clinic has received the results. If you do not hear from us within 7 days, please contact the clinic through MyChart or phone. If you have a critical or abnormal lab result, we will notify you by phone as soon as possible.  Submit refill requests through [employer] or call your pharmacy and they will forward the refill request to us. Please allow 3 business days for your refill to be completed.          Additional Information About Your Visit        [employer] [employer] Information     [employer] [employer] lets you send messages to your doctor, view your  "test results, renew your prescriptions, schedule appointments and more. To sign up, go to [email or web address] . Click on \"Log in\" on the left side of the screen, which will take you to the Welcome page. Then click on \"Sign up Now\" on the right side of the page.     You will be asked to enter the access code listed below, as well as some personal information. Please follow the directions to create your username and password.     Your access code is: [email or web address]  Expires: 10/31/2017  6:30 AM     Your access code will  in 90 days. If you need help or a new code, please call your [facility] clinic or [phone].        Care EveryWhere ID     This is your Care EveryWhere ID. This could be used by other organizations to access your [facility] medical records  [id number]        Your Vitals Were     Pulse Temperature Respirations Height Pulse Oximetry BMI (Body Mass Index)    110 98  F (36.7  C) (Oral) 16 1.499 m (4' 11.02\") 97% 15.38 kg/m2       Blood Pressure from Last 3 Encounters:   17 114/67   17 109/70   16 129/82    Weight from Last 3 Encounters:   17 34.6 kg (76 lb 3.2 oz)   16 34.7 kg (76 lb 9.6 oz)   16 34.6 kg (76 lb 4.8 oz)              We Performed the Following     *CBC with platelets differential     Comprehensive metabolic panel     Direct antiglobulin test     Ferritin     Folate     Haptoglobin     Lactate Dehydrogenase     Protein electrophoresis     Reticulocyte count     Vitamin B12        Primary Care Provider Office Phone # Fax #    Shlomo WINIFRED Rivera [phone]       [facility] [address] [facility] [address]        Equal Access to Services     BERENICE RAMIREZ : Hadii yue Wallace, yadira almaguer, qamelissata kaalmayolande wright, kenny salgado. Brandy [facility] [phone].    ATENCIÓN: Si habla español, tiene a menon disposición servicios gratuitos de asistencia lingüística. Britt rosado [phone].    We " comply with applicable federal civil rights laws and [place] laws. We do not discriminate on the basis of race, color, national origin, age, disability sex, sexual orientation or gender identity.            Thank you!     Thank you for choosing [facility]  for your care. Our goal is always to provide you with excellent care. Hearing back from our patients is one way we can continue to improve our services. Please take a few minutes to complete the written survey that you may receive in the mail after your visit with us. Thank you!             Your Updated Medication List - Protect others around you: Learn how to safely use, store and throw away your medicines at www.disposemymeds.org.          This list is accurate as of: 8/16/17 11:59 PM.  Always use your most recent med list.                   Brand Name Dispense Instructions for use Diagnosis    ALBUTEROL IN      Inhale 2 puffs into the lungs 4 times daily    Nodular lymphoma of extranodal and/or solid organ site (H)       Calcium-Vitamin D 600-200 MG-UNIT Tabs      Take 1 tablet by mouth daily.    Nodular lymphoma, unspecified site, extranodal and solid organ sites       PAXIL PO      Take 10 mg by mouth daily    NHL (non-Hodgkin's lymphoma) (H), Loss of weight       SPIRIVA HANDIHALER IN      Inhale 1 puff into the lungs daily    Nodular lymphoma of extranodal and/or solid organ site (H)       TYLENOL 500 MG tablet   Generic drug:  acetaminophen      Take 500-1,000 mg by mouth every 6 hours as needed for mild pain Reported on 4/19/2017

## 2017-08-17 LAB
ALBUMIN SERPL ELPH-MCNC: 4.5 G/DL (ref 3.7–5.1)
ALPHA1 GLOB SERPL ELPH-MCNC: 0.3 G/DL (ref 0.2–0.4)
ALPHA2 GLOB SERPL ELPH-MCNC: 0.7 G/DL (ref 0.5–0.9)
B-GLOBULIN SERPL ELPH-MCNC: 0.6 G/DL (ref 0.6–1)
GAMMA GLOB SERPL ELPH-MCNC: 0.6 G/DL (ref 0.7–1.6)
HAPTOGLOB SERPL-MCNC: 90 MG/DL (ref 35–175)
M PROTEIN SERPL ELPH-MCNC: 0 G/DL
PROT PATTERN SERPL ELPH-IMP: ABNORMAL

## 2017-11-29 ENCOUNTER — ONCOLOGY VISIT (OUTPATIENT)
Dept: ONCOLOGY | Facility: CLINIC | Age: 72
End: 2017-11-29
Attending: INTERNAL MEDICINE
Payer: MEDICARE

## 2017-11-29 ENCOUNTER — APPOINTMENT (OUTPATIENT)
Dept: LAB | Facility: CLINIC | Age: 72
End: 2017-11-29
Attending: INTERNAL MEDICINE
Payer: MEDICARE

## 2017-11-29 VITALS
RESPIRATION RATE: 16 BRPM | HEART RATE: 114 BPM | OXYGEN SATURATION: 98 % | BODY MASS INDEX: 15.24 KG/M2 | SYSTOLIC BLOOD PRESSURE: 107 MMHG | DIASTOLIC BLOOD PRESSURE: 68 MMHG | HEIGHT: 59 IN | TEMPERATURE: 97.8 F | WEIGHT: 75.62 LBS

## 2017-11-29 DIAGNOSIS — C82.99 NODULAR LYMPHOMA OF EXTRANODAL AND/OR SOLID ORGAN SITE (H): ICD-10-CM

## 2017-11-29 DIAGNOSIS — D63.0 ANEMIA IN NEOPLASTIC DISEASE: ICD-10-CM

## 2017-11-29 LAB
ALBUMIN SERPL-MCNC: 4.2 G/DL (ref 3.4–5)
ALP SERPL-CCNC: 63 U/L (ref 40–150)
ALT SERPL W P-5'-P-CCNC: 19 U/L (ref 0–50)
ANION GAP SERPL CALCULATED.3IONS-SCNC: 2 MMOL/L (ref 3–14)
AST SERPL W P-5'-P-CCNC: 20 U/L (ref 0–45)
BASOPHILS # BLD AUTO: 0 10E9/L (ref 0–0.2)
BASOPHILS NFR BLD AUTO: 0.4 %
BILIRUB SERPL-MCNC: 0.4 MG/DL (ref 0.2–1.3)
BUN SERPL-MCNC: 14 MG/DL (ref 7–30)
CALCIUM SERPL-MCNC: 9.6 MG/DL (ref 8.5–10.1)
CHLORIDE SERPL-SCNC: 95 MMOL/L (ref 94–109)
CO2 SERPL-SCNC: 39 MMOL/L (ref 20–32)
CREAT SERPL-MCNC: 0.78 MG/DL (ref 0.52–1.04)
DIFFERENTIAL METHOD BLD: ABNORMAL
EOSINOPHIL # BLD AUTO: 0.1 10E9/L (ref 0–0.7)
EOSINOPHIL NFR BLD AUTO: 1.7 %
ERYTHROCYTE [DISTWIDTH] IN BLOOD BY AUTOMATED COUNT: 12.5 % (ref 10–15)
GFR SERPL CREATININE-BSD FRML MDRD: 73 ML/MIN/1.7M2
GLUCOSE SERPL-MCNC: 118 MG/DL (ref 70–99)
HCT VFR BLD AUTO: 37.3 % (ref 35–47)
HGB BLD-MCNC: 11.6 G/DL (ref 11.7–15.7)
IMM GRANULOCYTES # BLD: 0 10E9/L (ref 0–0.4)
IMM GRANULOCYTES NFR BLD: 0.4 %
LDH SERPL L TO P-CCNC: 163 U/L (ref 81–234)
LYMPHOCYTES # BLD AUTO: 0.8 10E9/L (ref 0.8–5.3)
LYMPHOCYTES NFR BLD AUTO: 14.3 %
MCH RBC QN AUTO: 31.2 PG (ref 26.5–33)
MCHC RBC AUTO-ENTMCNC: 31.1 G/DL (ref 31.5–36.5)
MCV RBC AUTO: 100 FL (ref 78–100)
MONOCYTES # BLD AUTO: 0.4 10E9/L (ref 0–1.3)
MONOCYTES NFR BLD AUTO: 7.9 %
NEUTROPHILS # BLD AUTO: 4 10E9/L (ref 1.6–8.3)
NEUTROPHILS NFR BLD AUTO: 75.3 %
NRBC # BLD AUTO: 0 10*3/UL
NRBC BLD AUTO-RTO: 0 /100
PLATELET # BLD AUTO: 120 10E9/L (ref 150–450)
POTASSIUM SERPL-SCNC: 5 MMOL/L (ref 3.4–5.3)
PROT SERPL-MCNC: 6.8 G/DL (ref 6.8–8.8)
RBC # BLD AUTO: 3.72 10E12/L (ref 3.8–5.2)
SODIUM SERPL-SCNC: 137 MMOL/L (ref 133–144)
WBC # BLD AUTO: 5.3 10E9/L (ref 4–11)

## 2017-11-29 PROCEDURE — 83615 LACTATE (LD) (LDH) ENZYME: CPT | Performed by: INTERNAL MEDICINE

## 2017-11-29 PROCEDURE — 99213 OFFICE O/P EST LOW 20 MIN: CPT | Mod: ZP | Performed by: INTERNAL MEDICINE

## 2017-11-29 PROCEDURE — 99213 OFFICE O/P EST LOW 20 MIN: CPT | Mod: ZF

## 2017-11-29 PROCEDURE — 36415 COLL VENOUS BLD VENIPUNCTURE: CPT

## 2017-11-29 PROCEDURE — 85025 COMPLETE CBC W/AUTO DIFF WBC: CPT | Performed by: INTERNAL MEDICINE

## 2017-11-29 PROCEDURE — 80053 COMPREHEN METABOLIC PANEL: CPT | Performed by: INTERNAL MEDICINE

## 2017-11-29 ASSESSMENT — PAIN SCALES - GENERAL
PAINLEVEL: NO PAIN (0)
PAINLEVEL: NO PAIN (0)

## 2017-11-29 NOTE — NURSING NOTE
Chief Complaint   Patient presents with     Blood Draw     labs drawn from left arm via venipuncture and vitals taken by CAM Gar CMA November 29, 2017

## 2017-11-29 NOTE — PROGRESS NOTES
"ONCOLOGY SUMMARY:  Michelle Baker is a 72-year-old woman with a history of follicular lymphoma diagnosed with stage IVB disease in 10/2011 (scattered nodes, large abdominal mass, involvement and displacement of right kidney, marrow and skeletal involvement.  Initial therapy was with 4 weekly doses of rituximab in 09/2011.  She had a very good response to the rituximab and then subsequently progressed in the spring of 2013.  At this time, a node in the right inguinal region was re-biopsied.  There was no evidence of transformation.  However, because of the small biopsy, it could not be ruled out.  She was re-treated with rituximab in June-early July 2013.  In general, she had a favorable response, but it did not seem to hold.       By 02/2014, she had additional disease with enlarging kim disease in the chest, abdomen and pelvis, and involvement in the skeleton.  There was again a large retroperitoneal mass.  For this, she was treated with R-bendamustine beginning in 02/2014.  She had a total of 5 cycles ending in 06/2014.  An excellent response was identified after the fourth cycle by CT scan.  She did not receive the sixth cycle because of \"late onset neutropenia\" attributed to rituximab (resolved).  A PET/CT scan confirmed significant improvement in the retroperitoneum and at other sites.  Outside CT scan in 03/11/2015 showed complete resolution of the right retroperitoneal mass and other adenopathy.  Most recent CT scan in 04/2017 does not show recurrence.      Ms. Baker also has significant chronic obstructive pulmonary disease for which she is on inhalers plus home O2.      INTERVAL HISTORY:  Ms. Baker was last seen on 08/16/2017.  She indicates that the interval has been relatively uneventful.  Her major issue continues to be her COPD.  Her oxygen requirements have remained relatively stable, as have her symptoms.  She has received the influenza vaccine.       She indicates no interval fevers, night sweats, " weight loss or abdominal fullness/adenopathy.  Her appetite is good.  She has no bladder or bowel complaints.  She has no evidence of bleeding, but she does have easy bruisability in terms of her skin.      She has had no medication changes.  A 10-point review of systems is otherwise negative.       MEDICATIONS:     1.  Spiriva inhaler.    2.  Albuterol inhaler.    3.  Paroxetine.    4.  Acetaminophen p.r.n.    5.  Calcium plus vitamin D.       ALLERGIES:  None reported.      PHYSICAL EXAMINATION:   VITAL SIGNS:  Blood pressure initially 143/81 with a pulse of 124, but upon repeat 107/68 with a pulse of .  Respirations 16.  Temperature 97.8.  O2 saturation 98% on low flow O2.   HEENT:  No conjunctival lesions.  Anicteric.  Oropharynx - no masses.  Mucosa - moist.  No plaque or ulceration.   NECK:  No cervical or supraclavicular adenopathy.   CHEST:  Clear, but diminished breath sounds because of limited air entry.  No rales or rhonchi.  No dullness.    AXILLAE:  No nodes.   HEART:  Regular rhythm.  Soft apical systolic murmur (unchanged).  No gallop or rub.   ABDOMEN:  Soft.  Difficult to evaluate, but no detectable organomegaly or mass.  No tenderness.  Bowel sounds present.  No inguinal/femoral nodes.   EXTREMITIES:  No lower extremity edema.   SKIN:  A few scattered ecchymoses on her abdomen and hands/forearms.      LABORATORY:     Hemoglobin 11.6 grams percent with 5,300 WBCs (75% neutrophils, 14% lymphocytes) and 120,000 platelets.   Electrolytes - normal with the exception of an elevated carbon dioxide of 39.  Anion gap 2.     Creatinine 0.78.     Liver enzymes, including serum LDH - normal.     Glucose 118 (nonfasting).      ASSESSMENT AND PLAN:  Follicular lymphoma (grade 1), stage IVB - Ms. Baker continues to do well approximately 3-1/2 years following her last chemotherapy with rituximab plus bendamustine  (5 cycles).  She has no clinical evidence of recurrent disease or significant residua from  treatment.      We will plan to have Ms. Baker return to clinic in April, after the major threat of winter storms has passed.  We will obtain a surveillance CT scan.  Assuming that CT scan is stable, we will subsequently do additional surveillance scans on a every 2-3-year basis depending on clinical circumstances.  It is very difficult to clinically assess her abdomen and with a follicular lymphoma recurrence is probable, so periodic surveillance CT scans remain relevant.       Anemia - stable.  Lymphocytopenia is slightly improved.     Keith Ospina MD  Professor of Medicine  Oncology  [facility]  Office: [phone]  Clinic Fax: [phone]        cc:  Laurie Rivera, [facility]

## 2017-11-29 NOTE — NURSING NOTE
"Oncology Rooming Note    November 29, 2017 1:10 PM   Michelle Baker is a 72 year old female who presents for:    Chief Complaint   Patient presents with     Blood Draw     labs drawn from left arm via venipuncture and vitals taken by [facility]      Oncology Clinic Visit     Return visit related to NHL     Initial Vitals: /81 (BP Location: Right arm, Patient Position: Chair, Cuff Size: Adult Regular)  Pulse 124  Temp 97.8  F (36.6  C) (Oral)  Resp 16  Wt 34.3 kg (75 lb 11.2 oz)  SpO2 99%  BMI 15.28 kg/m2 Estimated body mass index is 15.28 kg/(m^2) as calculated from the following:    Height as of 8/16/17: 1.499 m (4' 11.02\").    Weight as of this encounter: 34.3 kg (75 lb 11.2 oz). Body surface area is 1.2 meters squared.  No Pain (0) Comment: Data Unavailable   No LMP recorded. Patient has had a hysterectomy.  Allergies reviewed: Yes  Medications reviewed: Yes    Medications: Medication refills not needed today.  Pharmacy name entered into EPIC:    [facility] - [place], [place] - [address] [facility] - [place], [place] - [address] [facility] - [place], [place] - [address]    Clinical concerns: No new concerns. Provider was notified.    10 minutes for nursing intake (face to face time)     Claudette Ivey LPN            "

## 2017-11-29 NOTE — MR AVS SNAPSHOT
After Visit Summary   11/29/2017    Michelle Baker    MRN: [id number]           Patient Information     Date Of Birth          1945        Visit Information        Provider Department      11/29/2017 1:30 PM Keith Ospina MD [facility]        Today's Diagnoses     Nodular lymphoma of extranodal and/or solid organ site (H)        Anemia in neoplastic disease           Follow-ups after your visit        Follow-up notes from your care team     Return in about 5 months (around 4/18/2018) for Physical Exam, Lab Work, CT or PET/CT.      Your next 10 appointments already scheduled     Apr 18, 2018 10:45 AM CDT   Lab with  LAB   [facility] ([facility])    [address] [place] [id number] [phone]            Apr 18, 2018 11:20 AM CDT   (Arrive by 11:05 AM)   CT CHEST/ABDOMEN/PELVIS W CONTRAST with UCCT2   [facility] [place] [facility] ([facility])    [address] [place] [id number] [phone]           Please bring any scans or X-rays taken at other hospitals, if similar tests were done. Also bring a list of your medicines, including vitamins, minerals and over-the-counter drugs. It is safest to leave personal items at home.  Be sure to tell your doctor:   If you have any allergies.   If there s any chance you are pregnant.   If you are breastfeeding.   If you have any special needs.  You may have contrast for this exam. To prepare:   Do not eat or drink for 2 hours before your exam. If you need to take medicine, you may take it with small sips of water. (We may ask you to take liquid medicine as well.)   The day before your exam, drink extra fluids at least six 8-ounce glasses (unless your doctor tells you to restrict your fluids).  Patients over 70 or patients with diabetes or kidney problems:   If you haven t had a blood test (creatinine test) within the last 30 days,  go to your clinic or Diagnostic Imaging Department for this test.  If you have diabetes:   If your kidney function is normal, continue taking your metformin (Avandamet, Glucophage, Glucovance, Metaglip) on the day of your exam.   If your kidney function is abnormal, wait 48 hours before restarting this medicine.  You will have oral contrast for this exam:   You will drink the contrast at home. Get this from your clinic or Diagnostic Imaging Department. Please follow the directions given.  Please wear loose clothing, such as a sweat suit or jogging clothes. Avoid snaps, zippers and other metal. We may ask you to undress and put on a hospital gown.  If you have any questions, please call the Imaging Department where you will have your exam.            Apr 18, 2018  2:30 PM CDT   (Arrive by 2:15 PM)   Return Visit with Keith Ospina MD   [facility] ([facility])    [address] [address] [phone]              Who to contact     If you have questions or need follow up information about today's clinic visit or your schedule please contact [facility] directly at [phone].  Normal or non-critical lab and imaging results will be communicated to you by MyChart, letter or phone within 4 business days after the clinic has received the results. If you do not hear from us within 7 days, please contact the clinic through MyChart or phone. If you have a critical or abnormal lab result, we will notify you by phone as soon as possible.  Submit refill requests through [employer] or call your pharmacy and they will forward the refill request to us. Please allow 3 business days for your refill to be completed.          Additional Information About Your Visit        [employer] Information     [employer] lets you send messages to your doctor, view your test results, renew your prescriptions, schedule appointments and more. To sign up,  "go to [email or web address] . Click on \"Log in\" on the left side of the screen, which will take you to the Welcome page. Then click on \"Sign up Now\" on the right side of the page.     You will be asked to enter the access code listed below, as well as some personal information. Please follow the directions to create your username and password.     Your access code is: [email or web address]  Expires: 2018  5:30 AM     Your access code will  in 90 days. If you need help or a new code, please call your [place] clinic or [phone].        Care EveryWhere ID     This is your Care EveryWhere ID. This could be used by other organizations to access your [facility] medical records  [id number]        Your Vitals Were     Pulse Temperature Respirations Height Pulse Oximetry BMI (Body Mass Index)    114 97.8  F (36.6  C) (Tympanic) 16 1.499 m (4' 11.02\") 98% 15.26 kg/m2       Blood Pressure from Last 3 Encounters:   17 107/68   17 114/67   17 109/70    Weight from Last 3 Encounters:   17 34.3 kg (75 lb 9.9 oz)   17 34.6 kg (76 lb 3.2 oz)   16 34.7 kg (76 lb 9.6 oz)              We Performed the Following     CBC with platelets differential     Comprehensive metabolic panel     Lactate Dehydrogenase        Primary Care Provider Office Phone # Fax #    Shlomo WINIFRED Rivera [phone]       [facility] [address] [facility] [address]        Equal Access to Services     BERENICE RAMIREZ : Hadveronica Wallace, yadira almaguer, qaeknny larsen. So [facility] [phone].    ATENCIÓN: Si habla español, tiene a menon disposición servicios gratuitos de asistencia lingüística. Britt al [phone].    We comply with applicable federal civil rights laws and [place] laws. We do not discriminate on the basis of race, color, national origin, age, disability, sex, sexual orientation, or gender identity.            Thank " you!     Thank you for choosing [facility]  for your care. Our goal is always to provide you with excellent care. Hearing back from our patients is one way we can continue to improve our services. Please take a few minutes to complete the written survey that you may receive in the mail after your visit with us. Thank you!             Your Updated Medication List - Protect others around you: Learn how to safely use, store and throw away your medicines at www.disposemymeds.org.          This list is accurate as of: 11/29/17 11:59 PM.  Always use your most recent med list.                   Brand Name Dispense Instructions for use Diagnosis    ALBUTEROL IN      Inhale 2 puffs into the lungs 4 times daily    Nodular lymphoma of extranodal and/or solid organ site (H)       Calcium-Vitamin D 600-200 MG-UNIT Tabs      Take 1 tablet by mouth daily.    Nodular lymphoma, unspecified site, extranodal and solid organ sites       PAXIL PO      Take 10 mg by mouth daily    NHL (non-Hodgkin's lymphoma) (H), Loss of weight       SPIRIVA HANDIHALER IN      Inhale 1 puff into the lungs daily    Nodular lymphoma of extranodal and/or solid organ site (H)       TYLENOL 500 MG tablet   Generic drug:  acetaminophen      Take 500-1,000 mg by mouth every 6 hours as needed for mild pain Reported on 4/19/2017

## 2017-11-29 NOTE — LETTER
"11/29/2017      RE: Michelle Baker  [address] [address]       ONCOLOGY SUMMARY:  Michelle Baker is a 72-year-old woman with a history of follicular lymphoma diagnosed with stage IVB disease in 10/2011 (scattered nodes, large abdominal mass, involvement and displacement of right kidney, marrow and skeletal involvement.  Initial therapy was with 4 weekly doses of rituximab in 09/2011.  She had a very good response to the rituximab and then subsequently progressed in the spring of 2013.  At this time, a node in the right inguinal region was re-biopsied.  There was no evidence of transformation.  However, because of the small biopsy, it could not be ruled out.  She was re-treated with rituximab in June-early July 2013.  In general, she had a favorable response, but it did not seem to hold.       By 02/2014, she had additional disease with enlarging kim disease in the chest, abdomen and pelvis, and involvement in the skeleton.  There was again a large retroperitoneal mass.  For this, she was treated with R-bendamustine beginning in 02/2014.  She had a total of 5 cycles ending in 06/2014.  An excellent response was identified after the fourth cycle by CT scan.  She did not receive the sixth cycle because of \"late onset neutropenia\" attributed to rituximab (resolved).  A PET/CT scan confirmed significant improvement in the retroperitoneum and at other sites.  Outside CT scan in 03/11/2015 showed complete resolution of the right retroperitoneal mass and other adenopathy.  Most recent CT scan in 04/2017 does not show recurrence.      Ms. Baker also has significant chronic obstructive pulmonary disease for which she is on inhalers plus home O2.      INTERVAL HISTORY:  Ms. Baker was last seen on 08/16/2017.  She indicates that the interval has been relatively uneventful.  Her major issue continues to be her COPD.  Her oxygen requirements have remained relatively stable, as have her symptoms.  She has " received the influenza vaccine.       She indicates no interval fevers, night sweats, weight loss or abdominal fullness/adenopathy.  Her appetite is good.  She has no bladder or bowel complaints.  She has no evidence of bleeding, but she does have easy bruisability in terms of her skin.      She has had no medication changes.  A 10-point review of systems is otherwise negative.       MEDICATIONS:     1.  Spiriva inhaler.    2.  Albuterol inhaler.    3.  Paroxetine.    4.  Acetaminophen p.r.n.    5.  Calcium plus vitamin D.       ALLERGIES:  None reported.      PHYSICAL EXAMINATION:   VITAL SIGNS:  Blood pressure initially 143/81 with a pulse of 124, but upon repeat 107/68 with a pulse of .  Respirations 16.  Temperature 97.8.  O2 saturation 98% on low flow O2.   HEENT:  No conjunctival lesions.  Anicteric.  Oropharynx - no masses.  Mucosa - moist.  No plaque or ulceration.   NECK:  No cervical or supraclavicular adenopathy.   CHEST:  Clear, but diminished breath sounds because of limited air entry.  No rales or rhonchi.  No dullness.    AXILLAE:  No nodes.   HEART:  Regular rhythm.  Soft apical systolic murmur (unchanged).  No gallop or rub.   ABDOMEN:  Soft.  Difficult to evaluate, but no detectable organomegaly or mass.  No tenderness.  Bowel sounds present.  No inguinal/femoral nodes.   EXTREMITIES:  No lower extremity edema.   SKIN:  A few scattered ecchymoses on her abdomen and hands/forearms.      LABORATORY:     Hemoglobin 11.6 grams percent with 5,300 WBCs (75% neutrophils, 14% lymphocytes) and 120,000 platelets.   Electrolytes - normal with the exception of an elevated carbon dioxide of 39.  Anion gap 2.     Creatinine 0.78.     Liver enzymes, including serum LDH - normal.     Glucose 118 (nonfasting).      ASSESSMENT AND PLAN:  Follicular lymphoma (grade 1), stage IVB - MsCarlos Baker continues to do well approximately 3-1/2 years following her last chemotherapy with rituximab plus bendamustine  (5  cycles).  She has no clinical evidence of recurrent disease or significant residua from treatment.      We will plan to have Ms. Baker return to clinic in April, after the major threat of winter storms has passed.  We will obtain a surveillance CT scan.  Assuming that CT scan is stable, we will subsequently do additional surveillance scans on a every 2-3-year basis depending on clinical circumstances.  It is very difficult to clinically assess her abdomen and with a follicular lymphoma recurrence is probable, so periodic surveillance CT scans remain relevant.       Anemia - stable.  Lymphocytopenia is slightly improved.     Keith Ospina MD  Professor of Medicine  Oncology  [facility]  Office: [phone]  Clinic Fax: [phone]        cc:  EVELYN Tovar MD

## 2017-11-30 ENCOUNTER — TELEPHONE (OUTPATIENT)
Dept: CARE COORDINATION | Facility: CLINIC | Age: 72
End: 2017-11-30

## 2017-11-30 NOTE — TELEPHONE ENCOUNTER
"Oncology Distress Screening Follow-up  Clinical Social Work   Health    Identified Concern and Score From Distress Screening: Pt scored a '10' for the following question: \"How concerned are you about feeling anxious or very scared?\"    Date of Distress Screenin17    Data: Michelle is a 71 y/o woman with a history of follicular lymphoma     Intervention: CSW left  for Michelle offering support and resources.     Education Provided: N/A    Follow-up Required: CSW provided Michelle with contact info for Onc SW (Annamaria Jauregui: [phone]) for any needed f/u.    Lissa Hammer, DAMON, LGSW  BMT         "

## 2018-05-02 ENCOUNTER — ONCOLOGY VISIT (OUTPATIENT)
Dept: ONCOLOGY | Facility: CLINIC | Age: 73
End: 2018-05-02
Attending: INTERNAL MEDICINE
Payer: MEDICARE

## 2018-05-02 ENCOUNTER — RADIANT APPOINTMENT (OUTPATIENT)
Dept: CT IMAGING | Facility: CLINIC | Age: 73
End: 2018-05-02
Attending: INTERNAL MEDICINE
Payer: MEDICARE

## 2018-05-02 VITALS
BODY MASS INDEX: 14.78 KG/M2 | DIASTOLIC BLOOD PRESSURE: 75 MMHG | HEART RATE: 103 BPM | WEIGHT: 73.19 LBS | TEMPERATURE: 98 F | RESPIRATION RATE: 18 BRPM | OXYGEN SATURATION: 97 % | SYSTOLIC BLOOD PRESSURE: 116 MMHG

## 2018-05-02 DIAGNOSIS — C82.99 NODULAR LYMPHOMA OF EXTRANODAL AND/OR SOLID ORGAN SITE (H): Primary | ICD-10-CM

## 2018-05-02 DIAGNOSIS — J43.9 PULMONARY EMPHYSEMA, UNSPECIFIED EMPHYSEMA TYPE (H): ICD-10-CM

## 2018-05-02 DIAGNOSIS — D63.0 ANEMIA IN NEOPLASTIC DISEASE: ICD-10-CM

## 2018-05-02 DIAGNOSIS — C82.99 NODULAR LYMPHOMA OF EXTRANODAL AND/OR SOLID ORGAN SITE (H): ICD-10-CM

## 2018-05-02 LAB
ALBUMIN SERPL-MCNC: 4.2 G/DL (ref 3.4–5)
ALP SERPL-CCNC: 64 U/L (ref 40–150)
ALT SERPL W P-5'-P-CCNC: 14 U/L (ref 0–50)
ANION GAP SERPL CALCULATED.3IONS-SCNC: 4 MMOL/L (ref 3–14)
AST SERPL W P-5'-P-CCNC: 19 U/L (ref 0–45)
BASOPHILS # BLD AUTO: 0 10E9/L (ref 0–0.2)
BASOPHILS NFR BLD AUTO: 0.2 %
BILIRUB SERPL-MCNC: 0.3 MG/DL (ref 0.2–1.3)
BUN SERPL-MCNC: 20 MG/DL (ref 7–30)
CALCIUM SERPL-MCNC: 9.7 MG/DL (ref 8.5–10.1)
CHLORIDE SERPL-SCNC: 95 MMOL/L (ref 94–109)
CO2 SERPL-SCNC: 40 MMOL/L (ref 20–32)
CREAT SERPL-MCNC: 0.76 MG/DL (ref 0.52–1.04)
DIFFERENTIAL METHOD BLD: ABNORMAL
EOSINOPHIL # BLD AUTO: 0.1 10E9/L (ref 0–0.7)
EOSINOPHIL NFR BLD AUTO: 1 %
ERYTHROCYTE [DISTWIDTH] IN BLOOD BY AUTOMATED COUNT: 12.5 % (ref 10–15)
GFR SERPL CREATININE-BSD FRML MDRD: 75 ML/MIN/1.7M2
GLUCOSE SERPL-MCNC: 105 MG/DL (ref 70–99)
HCT VFR BLD AUTO: 37.2 % (ref 35–47)
HGB BLD-MCNC: 11.7 G/DL (ref 11.7–15.7)
IMM GRANULOCYTES # BLD: 0 10E9/L (ref 0–0.4)
IMM GRANULOCYTES NFR BLD: 0.4 %
LDH SERPL L TO P-CCNC: 153 U/L (ref 81–234)
LYMPHOCYTES # BLD AUTO: 0.6 10E9/L (ref 0.8–5.3)
LYMPHOCYTES NFR BLD AUTO: 13 %
MCH RBC QN AUTO: 31.4 PG (ref 26.5–33)
MCHC RBC AUTO-ENTMCNC: 31.5 G/DL (ref 31.5–36.5)
MCV RBC AUTO: 100 FL (ref 78–100)
MONOCYTES # BLD AUTO: 0.4 10E9/L (ref 0–1.3)
MONOCYTES NFR BLD AUTO: 7.7 %
NEUTROPHILS # BLD AUTO: 3.8 10E9/L (ref 1.6–8.3)
NEUTROPHILS NFR BLD AUTO: 77.7 %
NRBC # BLD AUTO: 0 10*3/UL
NRBC BLD AUTO-RTO: 0 /100
PLATELET # BLD AUTO: 107 10E9/L (ref 150–450)
POTASSIUM SERPL-SCNC: 4.8 MMOL/L (ref 3.4–5.3)
PROT SERPL-MCNC: 7 G/DL (ref 6.8–8.8)
RBC # BLD AUTO: 3.73 10E12/L (ref 3.8–5.2)
SODIUM SERPL-SCNC: 139 MMOL/L (ref 133–144)
URATE SERPL-MCNC: 4.4 MG/DL (ref 2.6–6)
WBC # BLD AUTO: 4.9 10E9/L (ref 4–11)

## 2018-05-02 PROCEDURE — 85025 COMPLETE CBC W/AUTO DIFF WBC: CPT | Performed by: INTERNAL MEDICINE

## 2018-05-02 PROCEDURE — 83615 LACTATE (LD) (LDH) ENZYME: CPT | Performed by: INTERNAL MEDICINE

## 2018-05-02 PROCEDURE — G0463 HOSPITAL OUTPT CLINIC VISIT: HCPCS | Mod: ZF

## 2018-05-02 PROCEDURE — 84550 ASSAY OF BLOOD/URIC ACID: CPT | Performed by: INTERNAL MEDICINE

## 2018-05-02 PROCEDURE — 00000402 ZZHCL STATISTIC TOTAL PROTEIN: Performed by: INTERNAL MEDICINE

## 2018-05-02 PROCEDURE — 84165 PROTEIN E-PHORESIS SERUM: CPT | Performed by: INTERNAL MEDICINE

## 2018-05-02 PROCEDURE — 99214 OFFICE O/P EST MOD 30 MIN: CPT | Mod: ZP | Performed by: INTERNAL MEDICINE

## 2018-05-02 PROCEDURE — 80053 COMPREHEN METABOLIC PANEL: CPT | Performed by: INTERNAL MEDICINE

## 2018-05-02 PROCEDURE — 36415 COLL VENOUS BLD VENIPUNCTURE: CPT | Performed by: INTERNAL MEDICINE

## 2018-05-02 RX ORDER — IOPAMIDOL 755 MG/ML
46 INJECTION, SOLUTION INTRAVASCULAR ONCE
Status: COMPLETED | OUTPATIENT
Start: 2018-05-02 | End: 2018-05-02

## 2018-05-02 RX ADMIN — IOPAMIDOL 46 ML: 755 INJECTION, SOLUTION INTRAVASCULAR at 12:08

## 2018-05-02 ASSESSMENT — PAIN SCALES - GENERAL: PAINLEVEL: NO PAIN (0)

## 2018-05-02 NOTE — DISCHARGE INSTRUCTIONS

## 2018-05-02 NOTE — LETTER
"5/2/2018      RE: Michelle Baker  [address] [address]       ONCOLOGY SUMMARY:   Michelle Baker is a 73-year-old woman with a history of follicular lymphoma diagnosed with stage IVB disease in 10/2011 (scattered nodes, large abdominal mass, involvement and displacement of right kidney, marrow and skeletal involvement.  Initial therapy was with 4 weekly doses of rituximab in 09/2011.  She had a very good response to the rituximab and then subsequently progressed in the spring of 2013.  At this time, a node in the right inguinal region was re-biopsied.  There was no evidence of transformation.  However, because of the small biopsy, it could not be ruled out.  She was re-treated with rituximab in June-early July 2013.  In general, she had a favorable response, but it did not hold.        By 02/2014, she had additional disease with enlarging kim disease in the chest, abdomen and pelvis, and involvement in the skeleton.  There was again a large retroperitoneal mass.  For this, she was treated with R-bendamustine beginning in 02/2014.  She had a total of 5 cycles ending in 06/2014.  An excellent response was identified after the fourth cycle by CT scan.  She did not receive the sixth cycle because of \"late onset neutropenia\" attributed to rituximab (resolved).  A PET/CT scan confirmed significant improvement in the retroperitoneum and at other sites.  Outside CT scan in 03/11/2015 showed complete resolution of the right retroperitoneal mass and other adenopathy.  Most recent CT scan in 04/2017 did not show progression.      Ms. Baker also has significant chronic obstructive pulmonary disease for which she is on inhalers plus home O2.      INTERVAL HISTORY:  I last saw Ms. Baker on 11/29/2017.  She states that she has been doing very well since that time with no major new issues.  She does relate that she recently got bifocals and has since noted double vision and not being able to see very well.  She " does not have diplopia, however, when the bifocals are off.      No significant intercurrent infections.  Respiratory status is unchanged.  She remains on low flow O2.      REVIEW OF SYSTEMS:  A 10-point review of systems otherwise negative, including abdominal or GI complaints.      MEDICATIONS:     1.  Acetaminophen p.r.n.     2.  Albuterol inhaler.     3.  Calcium plus vitamin D.    4.  Paroxetine 30 mg daily.     5.  Spiriva inhaler.      ALLERGIES:  None reported.      PHYSICAL EXAMINATION:   VITAL SIGNS:  Weight 33.2 kg, blood pressure 116/75, pulse , respirations 18, temperature 98.0.  O2 saturation 97% on room air.   HEENT:  Anicteric.  No conjunctival injection.  Pupils equal and reactive.  EOMI intact.  No diplopia elicited.  Oropharynx no masses.  Mucosae moist without lesion.   NECK:  No cervical/supraclavicular adenopathy.   CHEST:  Clear to auscultation.  Diminished air entry throughout.   AXILLAE:  No nodes.   HEART:  Regular rhythm.  Apical systolic murmur, soft and unchanged.  No gallop.   ABDOMEN:  Soft.  Evaluated while sitting in wheelchair but no detectable organomegaly or mass.  No tenderness.  Bowel sounds active.  No inguinal nodes.   EXTREMITIES:  No lower extremity edema.  No epitrochlear nodes.   SKIN:  No rashes.      LABORATORY:      Hemoglobin 11.7 grams percent with 4900 WBCs (normal differential) and 107,000 platelets.     Electrolytes, calcium, creatinine (0.76) are normal.     Liver enzymes, including serum LDH, normal.     Uric acid 4.4.   Glucose 105 (nonfasting).     Serum protein electrophoresis:  Albumin 4.6, gamma fraction 0.5, no monoclonal peak.      CT scan:   1. Enlarged retroperitoneal lymph nodes with confluent lymphadenopathy in the left periaortic region encasing vessels as above, progressed from the prior study. Largest 3.6 x 2.4 cm (reviewed).  2. Prominent pelvic lymph nodes, slightly more conspicuous from the prior study. No lymphadenopathy in the chest.  3.  Marked centrilobular emphysematous changes. Stable bilateral pulmonary nodules. No new or enlarging pulmonary nodules.     ASSESSMENT/PLAN:  Follicular lymphoma (grade 1), Stage IVB.  Ms. Baker is now approximately 4 years following her last chemotherapy with rituximab plus bendamustine.  No evidence on physical examination of recurrent disease, but the CT scan identifies progressive intra-abdominal adenopathy.  This test was resulted after the patient had left clinic.  Findings were called to her.      Because of the increased node, plan to have the patient return in approximately 3 months with labs.  Will plan a CT scan for 6 months unless there are new problems.      The patient raised the issue of utilizing the new shingles vaccine.  In her situation, it is a safe vaccine and appropriate to use.  She will address this with her primary provider.     Keith Ospina MD  Professor of Medicine  Oncology  [facility]  Office: [phone]  Clinic Fax: [phone]       cc:   MARIVEL Tovar MD

## 2018-05-02 NOTE — PROGRESS NOTES
"ONCOLOGY SUMMARY:   Michelle Baker is a 73-year-old woman with a history of follicular lymphoma diagnosed with stage IVB disease in 10/2011 (scattered nodes, large abdominal mass, involvement and displacement of right kidney, marrow and skeletal involvement.  Initial therapy was with 4 weekly doses of rituximab in 09/2011.  She had a very good response to the rituximab and then subsequently progressed in the spring of 2013.  At this time, a node in the right inguinal region was re-biopsied.  There was no evidence of transformation.  However, because of the small biopsy, it could not be ruled out.  She was re-treated with rituximab in June-early July 2013.  In general, she had a favorable response, but it did not hold.        By 02/2014, she had additional disease with enlarging kim disease in the chest, abdomen and pelvis, and involvement in the skeleton.  There was again a large retroperitoneal mass.  For this, she was treated with R-bendamustine beginning in 02/2014.  She had a total of 5 cycles ending in 06/2014.  An excellent response was identified after the fourth cycle by CT scan.  She did not receive the sixth cycle because of \"late onset neutropenia\" attributed to rituximab (resolved).  A PET/CT scan confirmed significant improvement in the retroperitoneum and at other sites.  Outside CT scan in 03/11/2015 showed complete resolution of the right retroperitoneal mass and other adenopathy.  Most recent CT scan in 04/2017 did not show progression.      Ms. Baker also has significant chronic obstructive pulmonary disease for which she is on inhalers plus home O2.      INTERVAL HISTORY:  I last saw Ms. Baker on 11/29/2017.  She states that she has been doing very well since that time with no major new issues.  She does relate that she recently got bifocals and has since noted double vision and not being able to see very well.  She does not have diplopia, however, when the bifocals are off.      No significant " intercurrent infections.  Respiratory status is unchanged.  She remains on low flow O2.      REVIEW OF SYSTEMS:  A 10-point review of systems otherwise negative, including abdominal or GI complaints.      MEDICATIONS:     1.  Acetaminophen p.r.n.     2.  Albuterol inhaler.     3.  Calcium plus vitamin D.    4.  Paroxetine 30 mg daily.     5.  Spiriva inhaler.      ALLERGIES:  None reported.      PHYSICAL EXAMINATION:   VITAL SIGNS:  Weight 33.2 kg, blood pressure 116/75, pulse , respirations 18, temperature 98.0.  O2 saturation 97% on room air.   HEENT:  Anicteric.  No conjunctival injection.  Pupils equal and reactive.  EOMI intact.  No diplopia elicited.  Oropharynx no masses.  Mucosae moist without lesion.   NECK:  No cervical/supraclavicular adenopathy.   CHEST:  Clear to auscultation.  Diminished air entry throughout.   AXILLAE:  No nodes.   HEART:  Regular rhythm.  Apical systolic murmur, soft and unchanged.  No gallop.   ABDOMEN:  Soft.  Evaluated while sitting in wheelchair but no detectable organomegaly or mass.  No tenderness.  Bowel sounds active.  No inguinal nodes.   EXTREMITIES:  No lower extremity edema.  No epitrochlear nodes.   SKIN:  No rashes.      LABORATORY:      Hemoglobin 11.7 grams percent with 4900 WBCs (normal differential) and 107,000 platelets.     Electrolytes, calcium, creatinine (0.76) are normal.     Liver enzymes, including serum LDH, normal.     Uric acid 4.4.   Glucose 105 (nonfasting).     Serum protein electrophoresis:  Albumin 4.6, gamma fraction 0.5, no monoclonal peak.      CT scan:   1. Enlarged retroperitoneal lymph nodes with confluent lymphadenopathy in the left periaortic region encasing vessels as above, progressed from the prior study. Largest 3.6 x 2.4 cm (reviewed).  2. Prominent pelvic lymph nodes, slightly more conspicuous from the prior study. No lymphadenopathy in the chest.  3. Marked centrilobular emphysematous changes. Stable bilateral pulmonary nodules.  No new or enlarging pulmonary nodules.     ASSESSMENT/PLAN:  Follicular lymphoma (grade 1), Stage IVB.  Ms. Baker is now approximately 4 years following her last chemotherapy with rituximab plus bendamustine.  No evidence on physical examination of recurrent disease, but the CT scan identifies progressive intra-abdominal adenopathy.  This test was resulted after the patient had left clinic.  Findings were called to her.      Because of the increased node, plan to have the patient return in approximately 3 months with labs.  Will plan a CT scan for 6 months unless there are new problems.      The patient raised the issue of utilizing the new shingles vaccine.  In her situation, it is a safe vaccine and appropriate to use.  She will address this with her primary provider.     Keith Ospina MD  Professor of Medicine  Oncology  [facility]  Office: [phone]  Clinic Fax: [phone]       cc:   Laurie Rivera NP

## 2018-05-02 NOTE — NURSING NOTE
"Oncology Rooming Note    May 2, 2018 2:30 PM   Michelle Baker is a 73 year old female who presents for:    Chief Complaint   Patient presents with     Oncology Clinic Visit     Return vsiit related to NHL     Initial Vitals: /75  Pulse 103  Temp 98  F (36.7  C) (Oral)  Resp 18  Wt 33.2 kg (73 lb 3.1 oz)  SpO2 97%  BMI 14.78 kg/m2 Estimated body mass index is 14.78 kg/(m^2) as calculated from the following:    Height as of 11/29/17: 1.499 m (4' 11.02\").    Weight as of this encounter: 33.2 kg (73 lb 3.1 oz). Body surface area is 1.18 meters squared.  No Pain (0) Comment: Data Unavailable   No LMP recorded. Patient has had a hysterectomy.  Allergies reviewed: Yes  Medications reviewed: Yes    Medications: Medication refills not needed today.  Pharmacy name entered into EPIC:    [facility] [place] - [address] [facility] - [place], [place] - [address] [place] [address] [facility]ARMACY [id number] - [place], [place] - [address] [place] [address]    Clinical concerns: results  Bhavesh  was notified.    8  minutes for nursing intake (face to face time)     Irasema Mcnair MA              "

## 2018-05-02 NOTE — MR AVS SNAPSHOT
After Visit Summary   5/2/2018    Michelle Baker    MRN: [id number]           Patient Information     Date Of Birth          1945        Visit Information        Provider Department      5/2/2018 3:00 PM Keith Ospina MD formerly [facility]        Today's Diagnoses     Nodular lymphoma of extranodal and/or solid organ site (H)    -  1    Pulmonary emphysema, unspecified emphysema type (H)           Follow-ups after your visit        Follow-up notes from your care team     Return in about 6 months (around 11/2/2018) for Physical Exam, Lab Work.      Your next 10 appointments already scheduled     Oct 31, 2018 12:30 PM CDT   Lab with  LAB   [facility] ([facility])    [address]  1st Floor  [facility] [address] [phone]            Oct 31, 2018  1:00 PM CDT   (Arrive by 12:45 PM)   Return Visit with Keith Ospina MD   formerly [facility] ([facility])    [address]  [address]  [facility] [address] [phone]              Who to contact     If you have questions or need follow up information about today's clinic visit or your schedule please contact [facility] directly at [phone].  Normal or non-critical lab and imaging results will be communicated to you by MyChart, letter or phone within 4 business days after the clinic has received the results. If you do not hear from us within 7 days, please contact the clinic through MyChart or phone. If you have a critical or abnormal lab result, we will notify you by phone as soon as possible.  Submit refill requests through [employer] or call your pharmacy and they will forward the refill request to us. Please allow 3 business days for your refill to be completed.          Additional Information About Your Visit        [employer] Information     [employer] lets you send messages to your doctor, view your test results, renew  "your prescriptions, schedule appointments and more. To sign up, go to [email or web address] . Click on \"Log in\" on the left side of the screen, which will take you to the Welcome page. Then click on \"Sign up Now\" on the right side of the page.     You will be asked to enter the access code listed below, as well as some personal information. Please follow the directions to create your username and password.     Your access code is: [email or web address]  Expires: 2018  1:57 PM     Your access code will  in 90 days. If you need help or a new code, please call your [facility] clinic or [phone].        Care EveryWhere ID     This is your Care EveryWhere ID. This could be used by other organizations to access your [facility] medical records  [id number]        Your Vitals Were     Pulse Temperature Respirations Pulse Oximetry BMI (Body Mass Index)       103 98  F (36.7  C) (Oral) 18 97% 14.78 kg/m2        Blood Pressure from Last 3 Encounters:   18 116/75   17 107/68   17 114/67    Weight from Last 3 Encounters:   18 33.2 kg (73 lb 3.1 oz)   17 34.3 kg (75 lb 9.9 oz)   17 34.6 kg (76 lb 3.2 oz)               Primary Care Provider Office Phone # Fax #    Shlomo A MARIVEL Rivera [phone]       [facility] [address] [facility] [address]        Equal Access to Services     [facility] : Hadii aad ku hadasho Soomaali, waaxda luqadaha, qaybta kaalmada adeegyayolande, kenny salgado. So [facility] [phone].    ATENCIÓN: Si habla español, tiene a menon disposición servicios gratuitos de asistencia lingüística. Llame al [phone].    We comply with applicable federal civil rights laws and [place] laws. We do not discriminate on the basis of race, color, national origin, age, disability, sex, sexual orientation, or gender identity.            Thank you!     Thank you for choosing [facility]  for your care. Our goal is " always to provide you with excellent care. Hearing back from our patients is one way we can continue to improve our services. Please take a few minutes to complete the written survey that you may receive in the mail after your visit with us. Thank you!             Your Updated Medication List - Protect others around you: Learn how to safely use, store and throw away your medicines at www.disposemymeds.org.          This list is accurate as of 5/2/18 11:59 PM.  Always use your most recent med list.                   Brand Name Dispense Instructions for use Diagnosis    ALBUTEROL IN      Inhale 2 puffs into the lungs 4 times daily    Nodular lymphoma of extranodal and/or solid organ site (H)       Calcium-Vitamin D 600-200 MG-UNIT Tabs      Take 1 tablet by mouth daily.    Nodular lymphoma, unspecified site, extranodal and solid organ sites       PAXIL PO      Take 30 mg by mouth daily    NHL (non-Hodgkin's lymphoma) (H), Loss of weight       SPIRIVA HANDIHALER IN      Inhale 1 puff into the lungs daily    Nodular lymphoma of extranodal and/or solid organ site (H)       TYLENOL 500 MG tablet   Generic drug:  acetaminophen      Take 500-1,000 mg by mouth every 6 hours as needed for mild pain Reported on 4/19/2017

## 2018-05-03 LAB
ALBUMIN SERPL ELPH-MCNC: 4.6 G/DL (ref 3.7–5.1)
ALPHA1 GLOB SERPL ELPH-MCNC: 0.3 G/DL (ref 0.2–0.4)
ALPHA2 GLOB SERPL ELPH-MCNC: 0.7 G/DL (ref 0.5–0.9)
B-GLOBULIN SERPL ELPH-MCNC: 0.6 G/DL (ref 0.6–1)
GAMMA GLOB SERPL ELPH-MCNC: 0.5 G/DL (ref 0.7–1.6)
M PROTEIN SERPL ELPH-MCNC: 0 G/DL
PROT PATTERN SERPL ELPH-IMP: ABNORMAL

## 2018-05-07 ENCOUNTER — CARE COORDINATION (OUTPATIENT)
Dept: ONCOLOGY | Facility: CLINIC | Age: 73
End: 2018-05-07

## 2018-05-07 DIAGNOSIS — C82.99 NODULAR LYMPHOMA OF EXTRANODAL AND/OR SOLID ORGAN SITE (H): Primary | ICD-10-CM

## 2018-05-07 NOTE — PROGRESS NOTES
Called patient this afternoon to inform her that based on the CT scan results from last week that showed a slight increase in a retroperitoneal node, Dr. Ospina would like her to RTC in 3 months with labs. Patient did not answer her phone so a detailed message was left for her encouraging her to call if she had any questions, comments or concerns. RNCC sent a message to the scheduling team to schedule patient for labs and RTC appointment.

## 2018-08-15 ENCOUNTER — TRANSFERRED RECORDS (OUTPATIENT)
Dept: HEALTH INFORMATION MANAGEMENT | Facility: CLINIC | Age: 73
End: 2018-08-15

## 2018-08-15 LAB
ALBUMIN SERPL-MCNC: 4.4 G/DL
ALBUMIN/GLOBULIN RATIO - QUEST: 2
ALP SERPL-CCNC: 56 U/L
ALT SERPL-CCNC: 13 U/L
ANION GAP SERPL CALCULATED.3IONS-SCNC: 4.4 MMOL/L
AST SERPL-CCNC: 18 U/L
BASOPHILS # BLD AUTO: 0.01 10*3/UL
BASOPHILS NFR BLD AUTO: 0.2 %
BILIRUB SERPL-MCNC: 0.4 MG/DL
BUN SERPL-MCNC: 17 MG/DL
BUN/CREATININE RATIO: 21
CALCIUM SERPL-MCNC: 10 MG/DL
CHLORIDE SERPLBLD-SCNC: 95 MMOL/L
CO2 SERPL-SCNC: 40.6 MMOL/L
CREAT SERPL-MCNC: 0.8 MG/DL
EOSINOPHIL # BLD AUTO: 0.05 10*3/UL
EOSINOPHIL NFR BLD AUTO: 1.2 %
ERYTHROCYTE [DISTWIDTH] IN BLOOD BY AUTOMATED COUNT: 12.6 %
GFR SERPL CREATININE-BSD FRML MDRD: ABNORMAL ML/MIN/1.73M2
GLOBULIN: 2.2 G/DL
GLUCOSE SERPL-MCNC: 200 MG/DL (ref 70–99)
HCT VFR BLD AUTO: 39.2 %
HEMOGLOBIN: 12 G/DL (ref 11.7–15.7)
LYMPHOCYTES # BLD AUTO: 0.93 10*3/UL
LYMPHOCYTES NFR BLD AUTO: 21.6 %
MCH RBC QN AUTO: 31 PG
MCHC RBC AUTO-ENTMCNC: 30.6 G/DL
MCV RBC AUTO: 101.3 FL
MONOCYTES # BLD AUTO: 0.68 10*3/UL
MONOCYTES NFR BLD AUTO: 15.8 %
NEUTROPHILS # BLD AUTO: 2.64 10*3/UL
NEUTROPHILS NFR BLD AUTO: 61.2 %
PLATELET # BLD AUTO: 138 10^9/L
POTASSIUM SERPL-SCNC: 4.8 MMOL/L
PROT SERPL-MCNC: 6.6 G/DL
RBC # BLD AUTO: 3.87 10^12/L
SODIUM SERPL-SCNC: 140 MMOL/L
WBC # BLD AUTO: 4.3 10^9/L

## 2018-09-19 ENCOUNTER — ONCOLOGY VISIT (OUTPATIENT)
Dept: ONCOLOGY | Facility: CLINIC | Age: 73
End: 2018-09-19
Attending: INTERNAL MEDICINE
Payer: MEDICARE

## 2018-09-19 VITALS
RESPIRATION RATE: 16 BRPM | BODY MASS INDEX: 14.78 KG/M2 | SYSTOLIC BLOOD PRESSURE: 100 MMHG | DIASTOLIC BLOOD PRESSURE: 69 MMHG | HEIGHT: 59 IN | TEMPERATURE: 97.6 F | HEART RATE: 110 BPM | OXYGEN SATURATION: 98 %

## 2018-09-19 DIAGNOSIS — C82.99 NODULAR LYMPHOMA OF EXTRANODAL AND/OR SOLID ORGAN SITE (H): Primary | ICD-10-CM

## 2018-09-19 DIAGNOSIS — J43.8 OTHER EMPHYSEMA (H): ICD-10-CM

## 2018-09-19 PROCEDURE — 99214 OFFICE O/P EST MOD 30 MIN: CPT | Mod: ZP | Performed by: INTERNAL MEDICINE

## 2018-09-19 PROCEDURE — G0463 HOSPITAL OUTPT CLINIC VISIT: HCPCS | Mod: ZF

## 2018-09-19 ASSESSMENT — PAIN SCALES - GENERAL: PAINLEVEL: NO PAIN (0)

## 2018-09-19 NOTE — NURSING NOTE
"Oncology Rooming Note    September 19, 2018 3:11 PM   Michelle Baker is a 73 year old female who presents for:    Chief Complaint   Patient presents with     Oncology Clinic Visit     NHL     Initial Vitals: /69  Pulse 110  Temp 97.6  F (36.4  C) (Oral)  Resp 16  Ht 1.499 m (4' 11\")  SpO2 98%  BMI 14.78 kg/m2 Estimated body mass index is 14.78 kg/(m^2) as calculated from the following:    Height as of this encounter: 1.499 m (4' 11\").    Weight as of 5/2/18: 33.2 kg (73 lb 3.1 oz). Body surface area is 1.18 meters squared.  No Pain (0) Comment: Data Unavailable   No LMP recorded. Patient has had a hysterectomy.  Allergies reviewed: Yes  Medications reviewed: Yes    Medications: Medication refills not needed today.  Pharmacy name entered into EPIC:    [facility] [place] - [address] [facility] - [place], [place] - [address] [place] [address] [facility]ARMACY [id number] - [place], [place] - [address] [place] [address]    Clinical concerns: No New Concerns    5 minutes for nursing intake (face to face time)     DAWIT Orozco      "

## 2018-09-19 NOTE — MR AVS SNAPSHOT
After Visit Summary   9/19/2018    Michelle Baker    MRN: [id number]           Patient Information     Date Of Birth          1945        Visit Information        Provider Department      9/19/2018 3:30 PM Keith Ospina MD [facility]        Today's Diagnoses     Nodular lymphoma of extranodal and/or solid organ site (H)    -  1    Other emphysema (H)           Follow-ups after your visit        Follow-up notes from your care team     Return in about 3 months (around 12/19/2018) for Physical Exam, Lab Work, CT or PET/CT.      Your next 10 appointments already scheduled     Dec 19, 2018 12:00 PM CST   Lab with UC LAB   [facility] ([facility])    [address] [place] [id number] [phone]            Dec 19, 2018 12:20 PM CST   CT CHEST/ABDOMEN/PELVIS W CONTRAST with UCCT2   [facility] CT ([facility])    [address] [place] [id number] [phone]           How do I prepare for my exam? (Food and drink instructions) To prepare: Do not eat or drink for 2 hours before your exam. If you need to take medicine, you may take it with small sips of water. (We may ask you to take liquid medicine as well.)  How do I prepare for my exam? (Other instructions) Please arrive 30 minutes early for your CT.  Once in the department you might be asked to drink water 15-20 minutes prior to your exam.  If indicated you may be asked to drink an oral contrast in advance of your CT.  If this is the case, the imaging team will let you know or be in contact with you prior to your appointment  Patients over 70 or patients with diabetes or kidney problems: If you haven t had a blood test (creatinine test) within the last 30 days, the Cardiologist/Radiologist may require you to get this test prior to your exam.  If you have diabetes:  Continue to take your metformin medication on  the day of your exam  What should I wear: Please wear loose clothing, such as a sweat suit or jogging clothes. Avoid snaps, zippers and other metal. We may ask you to undress and put on a hospital gown.  How long does the exam take: Most scans take less than 20 minutes.  What should I bring: Please bring any scans or X-rays taken at other hospitals, if similar tests were done. Also bring a list of your medicines, including vitamins, minerals and over-the-counter drugs. It is safest to leave personal items at home.  Do I need a : No  is needed.  What do I need to tell my doctor? Be sure to tell your doctor: * If you have any allergies. * If there s any chance you are pregnant. * If you are breastfeeding.  What should I do after the exam: No restrictions, You may resume normal activities.  What is this test: A CT (computed tomography) scan is a series of pictures that allows us to look inside your body. The scanner creates images of the body in cross sections, much like slices of bread. This helps us see any problems more clearly. You may receive contrast (X-ray dye) before or during your scan. You will be asked to drink the contrast.  Who should I call with questions: If you have any questions, please call the Imaging Department where you will have your exam. Directions, parking instructions, and other information is available on our website, [email or web address].            Dec 19, 2018  3:30 PM CST   (Arrive by 3:15 PM)   Return Visit with Keith Ospina MD   [facility] ([facility])    [address] [place] [address]  [address] [place] [address] [phone]              Future tests that were ordered for you today     Open Future Orders        Priority Expected Expires Ordered    CT Chest/Abdomen/Pelvis w Contrast Routine 12/19/2018 9/19/2019 9/19/2018    Comprehensive metabolic panel Routine 12/19/2018 9/19/2019 9/19/2018    Lactate Dehydrogenase Routine  "12/19/2018 9/19/2019 9/19/2018    Uric acid Routine 12/19/2018 9/19/2019 9/19/2018    *CBC with platelets differential Routine 12/19/2018 9/19/2019 9/19/2018            Who to contact     If you have questions or need follow up information about today's clinic visit or your schedule please contact [facility] directly at [phone].  Normal or non-critical lab and imaging results will be communicated to you by MyChart, letter or phone within 4 business days after the clinic has received the results. If you do not hear from us within 7 days, please contact the clinic through MyChart or phone. If you have a critical or abnormal lab result, we will notify you by phone as soon as possible.  Submit refill requests through [employer] or call your pharmacy and they will forward the refill request to us. Please allow 3 business days for your refill to be completed.          Additional Information About Your Visit        Care EveryWhere ID     This is your Care EveryWhere ID. This could be used by other organizations to access your [facility] medical records  [id number]        Your Vitals Were     Pulse Temperature Respirations Height Pulse Oximetry BMI (Body Mass Index)    110 97.6  F (36.4  C) (Oral) 16 1.499 m (4' 11\") 98% 14.78 kg/m2       Blood Pressure from Last 3 Encounters:   09/19/18 100/69   05/02/18 116/75   11/29/17 107/68    Weight from Last 3 Encounters:   05/02/18 33.2 kg (73 lb 3.1 oz)   11/29/17 34.3 kg (75 lb 9.9 oz)   08/16/17 34.6 kg (76 lb 3.2 oz)               Primary Care Provider Office Phone # Fax #    Shlomo WINIFRED Rivera [phone]       [facility] [address] [facility] [address]        Equal Access to Services     BERENICE RAMIREZ : yadira Lau, kenny mckeon. [facility] [phone].    ATENCIÓN: Si habla español, tiene a menon disposición servicios gratuitos de asistencia " lingüísticaCarlos De La Torre al [phone].    We comply with applicable federal civil rights laws and [place] laws. We do not discriminate on the basis of race, color, national origin, age, disability, sex, sexual orientation, or gender identity.            Thank you!     Thank you for choosing [facility]  for your care. Our goal is always to provide you with excellent care. Hearing back from our patients is one way we can continue to improve our services. Please take a few minutes to complete the written survey that you may receive in the mail after your visit with us. Thank you!             Your Updated Medication List - Protect others around you: Learn how to safely use, store and throw away your medicines at www.disposemymeds.org.          This list is accurate as of 9/19/18 11:59 PM.  Always use your most recent med list.                   Brand Name Dispense Instructions for use Diagnosis    ALBUTEROL IN      Inhale 2 puffs into the lungs 4 times daily    Nodular lymphoma of extranodal and/or solid organ site (H)       Calcium-Vitamin D 600-200 MG-UNIT Tabs      Take 1 tablet by mouth daily.    Nodular lymphoma, unspecified site, extranodal and solid organ sites       PAXIL PO      Take 30 mg by mouth daily    NHL (non-Hodgkin's lymphoma) (H), Loss of weight       SPIRIVA HANDIHALER IN      Inhale 1 puff into the lungs daily    Nodular lymphoma of extranodal and/or solid organ site (H)       TYLENOL 500 MG tablet   Generic drug:  acetaminophen      Take 500-1,000 mg by mouth every 6 hours as needed for mild pain Reported on 4/19/2017

## 2018-09-19 NOTE — LETTER
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  Initial therapy was with 4 weekly doses of rituximab in 09/2011.  She had a very good response to the rituximab and then subsequently progressed in the spring of 2013.  At this time, a node in the right inguinal region was re-biopsied.  There was no evidence of transformation.  However, because of the small biopsy, it could not be ruled out.  She was re-treated with rituximab in June-early July 2013.  In general, she had a favorable response, but it did not hold.        By 02/2014, she had additional disease with enlarging kim disease in the chest, abdomen and pelvis, and involvement in the skeleton.  There was again a large retroperitoneal mass.  For this, she was treated with R-bendamustine beginning in 02/2014.  She had a total of 5 cycles ending in 06/2014.  An excellent response was identified after the fourth cycle by CT scan.  She did not receive the sixth cycle because of \"late onset neutropenia\" attributed to rituximab (resolved).  A PET/CT scan confirmed significant improvement in the retroperitoneum and at other sites.  Outside CT scan in 03/11/2015 showed complete resolution of the right retroperitoneal mass and other adenopathy.  Most recent CT scan on 05/22/2018 showed progression in retroperitoneal nodes.      Ms. Baker also has severe chronic obstructive pulmonary disease for which she is on inhalers plus home O2.      INTERVAL HISTORY:  Ms. Baker was last in my clinic on 05/02/2018.  Although she was to come back in approximately 3 months, getting a ride has been very difficult.        The first thing she notes is that with prisms for her new lenses, the diplopia " reported at he last visit has resolved.  The problem apparently was the result of new bifocals.  No visual issues at present.      A major continuing health concern is that of her respiratory status, although what might be happening with her lymphoma also makes her anxious.  She indicates the respiratory status has not changed.  She has not had any recent pulmonary infections.  Remains on O2.      No fevers, night sweats or weight loss.  No adenopathy, abdominal discomfort, bowel complaints, dysuria or hematuria.      TEN-POINT REVIEW OF SYSTEMS:  Otherwise negative.       MEDICATIONS:   1.  Acetaminophen p.r.n.   2.  Albuterol inhaler.   3.  Calcium plus vitamin D.   4.  Roxatine.   5.  Spiriva HandiHaler.      ALLERGIES:  None reported.      PHYSICAL EXAMINATION:   VITAL SIGNS:  Weight 33.2 kg (stable), blood pressure 100/69, pulse 100-110, respirations 16, temperature 97.6.  O2 saturation:  98% on room air.  GENERAL: In wheelchair with nasal O2. Engaged/animated. Thin.   HEENT:  No icterus or conjunctival lesions.  EOM:  Intact.  No diplopia elicited.  No oropharyngeal masses.  Mucosa:  Moist.  No plaque or ulceration.   NECK:  No cervical/supraclavicular adenopathy.   CHEST:  Clear to auscultation.  Decreased air entry.  No pleural rub.  No dullness to percussion.   HEART:  Regular rhythm.  Soft apical systolic murmur.  No gallop or rub.   ABDOMEN:  Soft.  Difficult to feel because of muscle tension, but no palpable masses, tenderness, organomegaly.  Bowel sounds present.  No inguinal/femoral nodes.   EXTREMITIES:  No significant lower extremity edema.   SKIN:  No rashes.      LABORATORY:  None obtained today. Outside labs on 08/15/2018 show normal renal function, elevated CO2..      ASSESSMENT AND PLAN:  Follicular lymphoma (grade 1), stage IVB:  At the time of her last visit, Ms. Baker had  enlarging retroperitoneal nodes with the largest measuring up to ~3.5 cm.  No clinical evidence of progression on  today's evaluation.  However, these nodes would be difficult to feel even in this thin patient.      Return in 3 months with a CT scan and labs.      Patient reminded to get the influenza vaccine through primary clinic or wherever available  Consider the new shingles vaccine (Shingrix), which should be safe he her, and check status of Pneumococcal immunization.    Keith Ospina MD  Professor of Medicine  Oncology  [facility]  Office: [phone]  Clinic Fax: [phone]       cc:   [place] [address]           Keith Ospina MD

## 2018-12-19 ENCOUNTER — ANCILLARY PROCEDURE (OUTPATIENT)
Dept: CT IMAGING | Facility: CLINIC | Age: 73
End: 2018-12-19
Attending: INTERNAL MEDICINE
Payer: MEDICARE

## 2018-12-19 ENCOUNTER — ONCOLOGY VISIT (OUTPATIENT)
Dept: ONCOLOGY | Facility: CLINIC | Age: 73
End: 2018-12-19
Attending: INTERNAL MEDICINE
Payer: MEDICARE

## 2018-12-19 VITALS
DIASTOLIC BLOOD PRESSURE: 65 MMHG | BODY MASS INDEX: 14.29 KG/M2 | RESPIRATION RATE: 18 BRPM | WEIGHT: 70.77 LBS | HEART RATE: 113 BPM | TEMPERATURE: 97.4 F | OXYGEN SATURATION: 98 % | SYSTOLIC BLOOD PRESSURE: 113 MMHG

## 2018-12-19 DIAGNOSIS — J43.8 OTHER EMPHYSEMA (H): ICD-10-CM

## 2018-12-19 DIAGNOSIS — C82.99 NODULAR LYMPHOMA OF EXTRANODAL AND/OR SOLID ORGAN SITE (H): Primary | ICD-10-CM

## 2018-12-19 DIAGNOSIS — C82.99 NODULAR LYMPHOMA OF EXTRANODAL AND/OR SOLID ORGAN SITE (H): ICD-10-CM

## 2018-12-19 DIAGNOSIS — D63.0 ANEMIA IN NEOPLASTIC DISEASE: ICD-10-CM

## 2018-12-19 LAB
ALBUMIN SERPL-MCNC: 3.3 G/DL (ref 3.4–5)
ALP SERPL-CCNC: 52 U/L (ref 40–150)
ALT SERPL W P-5'-P-CCNC: 17 U/L (ref 0–50)
ANION GAP SERPL CALCULATED.3IONS-SCNC: 5 MMOL/L (ref 3–14)
AST SERPL W P-5'-P-CCNC: 24 U/L (ref 0–45)
BASOPHILS # BLD AUTO: 0 10E9/L (ref 0–0.2)
BASOPHILS NFR BLD AUTO: 0.5 %
BILIRUB SERPL-MCNC: 0.4 MG/DL (ref 0.2–1.3)
BUN SERPL-MCNC: 16 MG/DL (ref 7–30)
CALCIUM SERPL-MCNC: 8.5 MG/DL (ref 8.5–10.1)
CHLORIDE SERPL-SCNC: 95 MMOL/L (ref 94–109)
CO2 SERPL-SCNC: 37 MMOL/L (ref 20–32)
CREAT BLD-MCNC: 0.9 MG/DL (ref 0.52–1.04)
CREAT SERPL-MCNC: 0.7 MG/DL (ref 0.52–1.04)
DIFFERENTIAL METHOD BLD: ABNORMAL
EOSINOPHIL # BLD AUTO: 0.1 10E9/L (ref 0–0.7)
EOSINOPHIL NFR BLD AUTO: 1.1 %
ERYTHROCYTE [DISTWIDTH] IN BLOOD BY AUTOMATED COUNT: 13.1 % (ref 10–15)
GFR SERPL CREATININE-BSD FRML MDRD: 62 ML/MIN/{1.73_M2}
GFR SERPL CREATININE-BSD FRML MDRD: 87 ML/MIN/{1.73_M2}
GLUCOSE SERPL-MCNC: 112 MG/DL (ref 70–99)
HCT VFR BLD AUTO: 33.8 % (ref 35–47)
HGB BLD-MCNC: 10.3 G/DL (ref 11.7–15.7)
IMM GRANULOCYTES # BLD: 0 10E9/L (ref 0–0.4)
IMM GRANULOCYTES NFR BLD: 0.2 %
LDH SERPL L TO P-CCNC: NORMAL U/L (ref 81–234)
LYMPHOCYTES # BLD AUTO: 1 10E9/L (ref 0.8–5.3)
LYMPHOCYTES NFR BLD AUTO: 23.1 %
MCH RBC QN AUTO: 30.4 PG (ref 26.5–33)
MCHC RBC AUTO-ENTMCNC: 30.5 G/DL (ref 31.5–36.5)
MCV RBC AUTO: 100 FL (ref 78–100)
MONOCYTES # BLD AUTO: 0.4 10E9/L (ref 0–1.3)
MONOCYTES NFR BLD AUTO: 8.6 %
NEUTROPHILS # BLD AUTO: 2.9 10E9/L (ref 1.6–8.3)
NEUTROPHILS NFR BLD AUTO: 66.5 %
NRBC # BLD AUTO: 0 10*3/UL
NRBC BLD AUTO-RTO: 0 /100
PLATELET # BLD AUTO: 122 10E9/L (ref 150–450)
POTASSIUM SERPL-SCNC: 4.4 MMOL/L (ref 3.4–5.3)
PROT SERPL-MCNC: 5.8 G/DL (ref 6.8–8.8)
RBC # BLD AUTO: 3.39 10E12/L (ref 3.8–5.2)
SODIUM SERPL-SCNC: 137 MMOL/L (ref 133–144)
URATE SERPL-MCNC: 4 MG/DL (ref 2.6–6)
WBC # BLD AUTO: 4.4 10E9/L (ref 4–11)

## 2018-12-19 PROCEDURE — 36415 COLL VENOUS BLD VENIPUNCTURE: CPT | Performed by: INTERNAL MEDICINE

## 2018-12-19 PROCEDURE — 80053 COMPREHEN METABOLIC PANEL: CPT | Performed by: INTERNAL MEDICINE

## 2018-12-19 PROCEDURE — 84550 ASSAY OF BLOOD/URIC ACID: CPT | Performed by: INTERNAL MEDICINE

## 2018-12-19 PROCEDURE — 85025 COMPLETE CBC W/AUTO DIFF WBC: CPT | Performed by: INTERNAL MEDICINE

## 2018-12-19 PROCEDURE — 99215 OFFICE O/P EST HI 40 MIN: CPT | Mod: ZP | Performed by: INTERNAL MEDICINE

## 2018-12-19 PROCEDURE — G0463 HOSPITAL OUTPT CLINIC VISIT: HCPCS | Mod: ZF

## 2018-12-19 RX ORDER — IOPAMIDOL 755 MG/ML
46 INJECTION, SOLUTION INTRAVASCULAR ONCE
Status: COMPLETED | OUTPATIENT
Start: 2018-12-19 | End: 2018-12-19

## 2018-12-19 RX ADMIN — IOPAMIDOL 46 ML: 755 INJECTION, SOLUTION INTRAVASCULAR at 11:29

## 2018-12-19 ASSESSMENT — PAIN SCALES - GENERAL: PAINLEVEL: NO PAIN (0)

## 2018-12-19 NOTE — LETTER
"12/19/2018      RE: Michelle Baker  [address] [address]       ONCOLOGY OUTPATIENT NOTE      ONCOLOGY SUMMARY (Updated): Michelle Baker is a 73-year-old woman with a history of follicular lymphoma diagnosed with stage IVB disease in 10/2011 (scattered nodes, large abdominal mass, involvement and displacement of right kidney, marrow and skeletal involvement.  Initial therapy was with 4 weekly doses of rituximab in 09/2011.  She had a very good response to the rituximab and then subsequently progressed in the spring of 2013.  At this time, a node in the right inguinal region was re-biopsied.  There was no evidence of transformation.  However, because of the small biopsy, it could not be ruled out.  She was re-treated with rituximab in June-early July 2013.  In general, she had a favorable response, but it did not hold.        By 02/2014, she had additional disease with enlarging kim disease in the chest, abdomen and pelvis, and involvement in the skeleton.  There was again a large retroperitoneal mass.  For this, she was treated with R-bendamustine beginning in 02/2014.  She had a total of 5 cycles ending in 06/2014.  An excellent response was identified after the fourth cycle by CT scan.  She did not receive a sixth cycle because of \"late onset neutropenia\" attributed to rituximab (resolved).  A PET/CT scan confirmed significant improvement in the retroperitoneum and at other sites.  Outside CT scan in 03/11/2015 showed complete resolution of the right retroperitoneal mass and other adenopathy.  The most recent CT scan on 05/22/2018 showed progression in retroperitoneal nodes.      Ms. Baker also has very severe chronic obstructive pulmonary disease for which she is on inhalers plus home O2.       INTERVAL HISTORY:  I last saw Ms. Baker in clinic on 09/19/2018.  Since that visit, she indicates that she has been doing quite well.  Her respiratory status has not changed.  She finds it inconvenient " and limiting to be dyspneic, essentially nonambulatory and dependent on O2.  However, no pulmonary infections of note. She is able to accept her limitations.     Also, no fevers, night sweats or weight loss.  She states that her appetite is quite good. No abdominal pain. She did have one episode of diarrhea in the late summer, which she attributed to her anxiety. No noted bleeding, jaundice, overall change in stool color or urine.      TEN-POINT REVIEW OF SYSTEMS:  Otherwise negative.       MEDICATIONS:   1.  Acetaminophen p.r.n.   2.  Albuterol inhaler.   3.  Calcium plus vitamin D.   4.  Paroxetine.   5.  Spiriva HandiHaler.      ALLERGIES:  None reported.      PHYSICAL EXAMINATION:   VITAL SIGNS:  Weight 32.1 kg (compared with 33.2 kg in September), blood pressure 113/65, pulse , respirations 18, temperature 97.4.  O2 saturation:  98% on continuous low-flow O2.   GENERAL:  Sitting in a wheelchair with nasal O2.  Somewhat hard of hearing.  However, engaged, informed, good memory. Cachectic.   HEENT:  No icterus.  Pupils equal and reactive.  EOM:  Intact.  Oropharynx:  No masses.  Mucosa:  Moist without lesion.   NECK:  No cervical or supraclavicular adenopathy.   CHEST:  Clear to auscultation, but with decreased air entry.  No dullness.   HEART:  Regular rhythm.  Soft systolic murmur heard best at the apex.  No gallop or rub.   ABDOMEN:  Soft.  Not well examined in wheelchair, but patient unable to get up on table.  No detectable organomegaly.  No masses.  No tenderness.  Bowel sounds active.  No inguinal nodes.   EXTREMITIES:  Trace lower extremity edema.   SKIN:  No rashes.  Scattered ecchymoses.  No petechiae.      LABORATORY:   Hemoglobin 10.3 g% () with 4400 WBCs (66% neutrophils, 23% lymphocytes) and 122,000 platelets.   Electrolytes:  Normal with the exception of a bicarbonate at 37 (normal 20-32).  Creatinine 0.7.   Liver enzymes:  Normal.     Total protein 5.8, albumin 3.3.   Uric acid 4.0.       Chest and abdominal CT scan: COMPARISON: 5/2/2018.     LUNGS: Marked apical predominant centrilobular emphysematous changes. Mild bilateral lower lobes dependent atelectasis. No acute airspace opacities. Pulmonary nodules measuring up to 4 mm in the right lower lobe. No new or enlarging pulmonary nodules. Stable mild bronchial wall thickening in the lower lobes.     Chest: Punctate focus of calcification in the right thyroid lobe. Central tracheobronchial tree is patent. Patulous esophagus. Thoracic aorta and main pulmonary artery have normal diameter. Atherosclerotic calcifications of the thoracic aorta, great vessels and coronary arteries. No central pulmonary embolism. Cardiac size within normal limits. Aortic root calcifications. No pericardial effusions. Minimal fluid in the superior aortic recess. No pleural effusions. No pneumothorax. Subcentimeter mediastinal and hilar lymph nodes measuring up to 9 mm in the right hilum, stable. No new or enlarging thoracic lymphadenopathy.     Abdomen and pelvis: Residual soft tissue conglomerate lymphadenopathy left retrocrural/retroperitoneal, significantly improved from the prior study now measuring approximately 2.7 x 1.5 cm with necrotic changes, previously measuring 2.4 x 3.6 cm. There is persistent encasement of the left renal artery, left renal vein left adrenal gland. Prominent pelvic lymph nodes measuring up to 10 mm along the right pelvic sidewall, improved from the prior study.     Liver hypodensities, stable, some of them representing liver cysts and others too small to characterize. No new or enlarging liver lesions. Patent hepatic vasculature. No biliary tree dilation. Unremarkable gallbladder. Homogeneous pancreatic parenchyma. Main pancreatic duct is not dilated. Pancreas divisum. The spleen is not enlarged. No focal splenic lesions.     Right adrenal gland is unremarkable. Again noted pelvic position of the right kidney. Subcentimeter cortical  hypodensities in the kidneys, too small to characterize, stable, favoring renal cysts. No renal stones or hydronephrosis. Partially distended urinary bladder. Prior hysterectomy. No adnexal masses. Surgical clips in the pelvis. Pelvic phleboliths. Prominent pelvic veins. Surgical clips of lymph node dissection. No inguinal lymphadenopathy. No free fluid. No free air. Decompressed stomach. No dilated loops of bowel. No bowel wall thickening. Moderate colonic stool burden. No abdominal aortic aneurysm. Atherosclerotic calcifications of the abdominal aorta and its major branches.     Bones: Diffuse bone demineralization. Old healed fracture along the right inferior pubic ramus. Degenerative changes of the spine, bilateral SI joints and hips. 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 Degenerative changes of the shoulders. Scattered Schmorl nodes. No aggressive bone lesions. Multilevel wedge compression deformities of the thoracolumbar vertebral bodies particularly along the superior endplate of the vertebral body L4.                                                                   IMPRESSION:  1. Residual soft tissue conglomerate lymphadenopathy left retrocrural/retroperitoneal, significantly improved from the prior study encasing the local regional vessels as described above.  2. Prominent pelvic lymph nodes, improved from the prior study. No new or enlarging lymphadenopathy in the chest, abdomen or pelvis.  3. Marked centrilobular emphysematous changes. Stable bilateral pulmonary nodules. No new or enlarging pulmonary nodules.      ASSESSMENT AND PLAN:  Follicular lymphoma (grade 1), stage IVB:  Ms. Baker appears to be doing well at present.  At her previous radiographic assessment in 05/2018, there was enlargement seen in the confluent retroperitoneal lymph nodes.  On the current scan, there appears to have been necrosis in that group of nodes  with reduction in overall size of the aggregate.  Other lymph nodes in the abdomen, especially, have also spontaneously regressed slightly in size. The cause of the apparent regression is unclear.     In the absence of any threatening or progressive disease at present and with her multiple comorbidities, particularly her COPD, will continue to monitor Ms. Baker.  The patient will return in approximately 4 months to avoid traveling during the winter.  Laboratory studies ordered.  Anticipate repeating the CT scan in a year unless there are new questions or concern.      Anemia:  Intermittently anemic in past with a drop in hemoglobin noted today.  No identified blood loss or cause.  Requested a CBC through her local clinic in approximately 1 month.  Ms. Baker will contact the clinic.  Further workup as indicated.      Received the influenza vaccine.     Keith Ospina MD  Professor of Medicine  Oncology  [facility]  Office: [phone]  Clinic Fax: [phone]       cc:   [place] [address]           Keith Ospina MD

## 2018-12-19 NOTE — NURSING NOTE
"Oncology Rooming Note    December 19, 2018 2:59 PM   Michelle Baker is a 73 year old female who presents for:    Chief Complaint   Patient presents with     Oncology Clinic Visit     NHL, labs, CT     Initial Vitals: /65   Pulse 113   Temp 97.4  F (36.3  C) (Tympanic)   Resp 18   Wt 32.1 kg (70 lb 12.3 oz)   SpO2 98%   BMI 14.29 kg/m   Estimated body mass index is 14.29 kg/m  as calculated from the following:    Height as of 9/19/18: 1.499 m (4' 11\").    Weight as of this encounter: 32.1 kg (70 lb 12.3 oz). Body surface area is 1.16 meters squared.  No Pain (0) Comment: Data Unavailable   No LMP recorded. Patient has had a hysterectomy.  Allergies reviewed: Yes  Medications reviewed: Yes    Medications: Medication refills not needed today.  Pharmacy name entered into EPIC:    [facility] [place] [address] [facility] [place], [place] - [address] [id number] - [place], [place] - [address] [place] [address]    Clinical concerns: CT results  Bhavesh  was notified.    8   minutes for nursing intake (face to face time)     Irasema Mcnair MA              "

## 2018-12-19 NOTE — DISCHARGE INSTRUCTIONS

## 2018-12-19 NOTE — PROGRESS NOTES
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  Initial therapy was with 4 weekly doses of rituximab in 09/2011.  She had a very good response to the rituximab and then subsequently progressed in the spring of 2013.  At this time, a node in the right inguinal region was re-biopsied.  There was no evidence of transformation.  However, because of the small biopsy, it could not be ruled out.  She was re-treated with rituximab in June-early July 2013.  In general, she had a favorable response, but it did not hold.        By 02/2014, she had additional disease with enlarging kim disease in the chest, abdomen and pelvis, and involvement in the skeleton.  There was again a large retroperitoneal mass.  For this, she was treated with R-bendamustine beginning in 02/2014.  She had a total of 5 cycles ending in 06/2014.  An excellent response was identified after the fourth cycle by CT scan.  She did not receive a sixth cycle because of \"late onset neutropenia\" attributed to rituximab (resolved).  A PET/CT scan confirmed significant improvement in the retroperitoneum and at other sites.  Outside CT scan in 03/11/2015 showed complete resolution of the right retroperitoneal mass and other adenopathy.  The most recent CT scan on 05/22/2018 showed progression in retroperitoneal nodes.      Ms. Baker also has very severe chronic obstructive pulmonary disease for which she is on inhalers plus home O2.       INTERVAL HISTORY:  I last saw Ms. Baker in clinic on 09/19/2018.  Since that visit, she indicates that she has been doing quite well.  Her respiratory status has not changed.  She finds it inconvenient and limiting to be dyspneic, essentially nonambulatory and dependent on O2.  " However, no pulmonary infections of note. She is able to accept her limitations.     Also, no fevers, night sweats or weight loss.  She states that her appetite is quite good. No abdominal pain. She did have one episode of diarrhea in the late summer, which she attributed to her anxiety. No noted bleeding, jaundice, overall change in stool color or urine.      TEN-POINT REVIEW OF SYSTEMS:  Otherwise negative.       MEDICATIONS:   1.  Acetaminophen p.r.n.   2.  Albuterol inhaler.   3.  Calcium plus vitamin D.   4.  Paroxetine.   5.  Spiriva HandiHaler.      ALLERGIES:  None reported.      PHYSICAL EXAMINATION:   VITAL SIGNS:  Weight 32.1 kg (compared with 33.2 kg in September), blood pressure 113/65, pulse , respirations 18, temperature 97.4.  O2 saturation:  98% on continuous low-flow O2.   GENERAL:  Sitting in a wheelchair with nasal O2.  Somewhat hard of hearing.  However, engaged, informed, good memory. Cachectic.   HEENT:  No icterus.  Pupils equal and reactive.  EOM:  Intact.  Oropharynx:  No masses.  Mucosa:  Moist without lesion.   NECK:  No cervical or supraclavicular adenopathy.   CHEST:  Clear to auscultation, but with decreased air entry.  No dullness.   HEART:  Regular rhythm.  Soft systolic murmur heard best at the apex.  No gallop or rub.   ABDOMEN:  Soft.  Not well examined in wheelchair, but patient unable to get up on table.  No detectable organomegaly.  No masses.  No tenderness.  Bowel sounds active.  No inguinal nodes.   EXTREMITIES:  Trace lower extremity edema.   SKIN:  No rashes.  Scattered ecchymoses.  No petechiae.      LABORATORY:   Hemoglobin 10.3 g% () with 4400 WBCs (66% neutrophils, 23% lymphocytes) and 122,000 platelets.   Electrolytes:  Normal with the exception of a bicarbonate at 37 (normal 20-32).  Creatinine 0.7.   Liver enzymes:  Normal.     Total protein 5.8, albumin 3.3.   Uric acid 4.0.      Chest and abdominal CT scan: COMPARISON: 5/2/2018.     LUNGS: Marked  apical predominant centrilobular emphysematous changes. Mild bilateral lower lobes dependent atelectasis. No acute airspace opacities. Pulmonary nodules measuring up to 4 mm in the right lower lobe. No new or enlarging pulmonary nodules. Stable mild bronchial wall thickening in the lower lobes.     Chest: Punctate focus of calcification in the right thyroid lobe. Central tracheobronchial tree is patent. Patulous esophagus. Thoracic aorta and main pulmonary artery have normal diameter. Atherosclerotic calcifications of the thoracic aorta, great vessels and coronary arteries. No central pulmonary embolism. Cardiac size within normal limits. Aortic root calcifications. No pericardial effusions. Minimal fluid in the superior aortic recess. No pleural effusions. No pneumothorax. Subcentimeter mediastinal and hilar lymph nodes measuring up to 9 mm in the right hilum, stable. No new or enlarging thoracic lymphadenopathy.     Abdomen and pelvis: Residual soft tissue conglomerate lymphadenopathy left retrocrural/retroperitoneal, significantly improved from the prior study now measuring approximately 2.7 x 1.5 cm with necrotic changes, previously measuring 2.4 x 3.6 cm. There is persistent encasement of the left renal artery, left renal vein left adrenal gland. Prominent pelvic lymph nodes measuring up to 10 mm along the right pelvic sidewall, improved from the prior study.     Liver hypodensities, stable, some of them representing liver cysts and others too small to characterize. No new or enlarging liver lesions. Patent hepatic vasculature. No biliary tree dilation. Unremarkable gallbladder. Homogeneous pancreatic parenchyma. Main pancreatic duct is not dilated. Pancreas divisum. The spleen is not enlarged. No focal splenic lesions.     Right adrenal gland is unremarkable. Again noted pelvic position of the right kidney. Subcentimeter cortical hypodensities in the kidneys, too small to characterize, stable, favoring renal  cysts. No renal stones or hydronephrosis. Partially distended urinary bladder. Prior hysterectomy. No adnexal masses. Surgical clips in the pelvis. Pelvic phleboliths. Prominent pelvic veins. Surgical clips of lymph node dissection. No inguinal lymphadenopathy. No free fluid. No free air. Decompressed stomach. No dilated loops of bowel. No bowel wall thickening. Moderate colonic stool burden. No abdominal aortic aneurysm. Atherosclerotic calcifications of the abdominal aorta and its major branches.     Bones: Diffuse bone demineralization. Old healed fracture along the right inferior pubic ramus. Degenerative changes of the spine, bilateral SI joints and hips. 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 Degenerative changes of the shoulders. Scattered Schmorl nodes. No aggressive bone lesions. Multilevel wedge compression deformities of the thoracolumbar vertebral bodies particularly along the superior endplate of the vertebral body L4.                                                                   IMPRESSION:  1. Residual soft tissue conglomerate lymphadenopathy left retrocrural/retroperitoneal, significantly improved from the prior study encasing the local regional vessels as described above.  2. Prominent pelvic lymph nodes, improved from the prior study. No new or enlarging lymphadenopathy in the chest, abdomen or pelvis.  3. Marked centrilobular emphysematous changes. Stable bilateral pulmonary nodules. No new or enlarging pulmonary nodules.      ASSESSMENT AND PLAN:  Follicular lymphoma (grade 1), stage IVB:  Ms. Baker appears to be doing well at present.  At her previous radiographic assessment in 05/2018, there was enlargement seen in the confluent retroperitoneal lymph nodes.  On the current scan, there appears to have been necrosis in that group of nodes with reduction in overall size of the aggregate.  Other lymph nodes in the  abdomen, especially, have also spontaneously regressed slightly in size. The cause of the apparent regression is unclear.     In the absence of any threatening or progressive disease at present and with her multiple comorbidities, particularly her COPD, will continue to monitor Ms. Baker.  The patient will return in approximately 4 months to avoid traveling during the winter.  Laboratory studies ordered.  Anticipate repeating the CT scan in a year unless there are new questions or concern.      Anemia:  Intermittently anemic in past with a drop in hemoglobin noted today.  No identified blood loss or cause.  Requested a CBC through her local clinic in approximately 1 month.  Ms. Baker will contact the clinic.  Further workup as indicated.      Received the influenza vaccine.     Keith Ospina MD  Professor of Medicine  Oncology  [facility]  Office: [phone]  Clinic Fax: [phone]       cc:   [place] [address]

## 2019-01-01 ENCOUNTER — TRANSFERRED RECORDS (OUTPATIENT)
Dept: HEALTH INFORMATION MANAGEMENT | Facility: CLINIC | Age: 74
End: 2019-01-01

## 2019-01-01 ENCOUNTER — OFFICE VISIT (OUTPATIENT)
Dept: TRANSPLANT | Facility: CLINIC | Age: 74
End: 2019-01-01
Attending: INTERNAL MEDICINE
Payer: MEDICARE

## 2019-01-01 ENCOUNTER — APPOINTMENT (OUTPATIENT)
Dept: LAB | Facility: CLINIC | Age: 74
End: 2019-01-01
Attending: INTERNAL MEDICINE
Payer: MEDICARE

## 2019-01-01 VITALS
TEMPERATURE: 98.4 F | DIASTOLIC BLOOD PRESSURE: 69 MMHG | OXYGEN SATURATION: 99 % | WEIGHT: 73.7 LBS | BODY MASS INDEX: 14.89 KG/M2 | HEART RATE: 72 BPM | SYSTOLIC BLOOD PRESSURE: 138 MMHG | RESPIRATION RATE: 16 BRPM

## 2019-01-01 DIAGNOSIS — J43.8 OTHER EMPHYSEMA (H): ICD-10-CM

## 2019-01-01 DIAGNOSIS — C82.99 NODULAR LYMPHOMA OF EXTRANODAL AND/OR SOLID ORGAN SITE (H): ICD-10-CM

## 2019-01-01 DIAGNOSIS — D63.0 ANEMIA IN NEOPLASTIC DISEASE: ICD-10-CM

## 2019-01-01 LAB
ALBUMIN SERPL-MCNC: 4 G/DL (ref 3.4–5)
ALP SERPL-CCNC: 68 U/L (ref 40–150)
ALT SERPL W P-5'-P-CCNC: 17 U/L (ref 0–50)
ANION GAP SERPL CALCULATED.3IONS-SCNC: 1 MMOL/L (ref 3–14)
AST SERPL W P-5'-P-CCNC: 17 U/L (ref 0–45)
BASOPHILS # BLD AUTO: 0 #
BASOPHILS # BLD AUTO: 0 10E9/L (ref 0–0.2)
BASOPHILS NFR BLD AUTO: 0.3 %
BASOPHILS NFR BLD AUTO: 0.3 %
BILIRUB SERPL-MCNC: 0.3 MG/DL (ref 0.2–1.3)
BUN SERPL-MCNC: 21 MG/DL (ref 7–30)
CALCIUM SERPL-MCNC: 9.2 MG/DL (ref 8.5–10.1)
CHLORIDE SERPL-SCNC: 97 MMOL/L (ref 94–109)
CO2 SERPL-SCNC: 41 MMOL/L (ref 20–32)
CREAT SERPL-MCNC: 0.71 MG/DL (ref 0.52–1.04)
DIFFERENTIAL METHOD BLD: ABNORMAL
EOSINOPHIL # BLD AUTO: 0.1 #
EOSINOPHIL # BLD AUTO: 0.1 10E9/L (ref 0–0.7)
EOSINOPHIL NFR BLD AUTO: 2.1 %
EOSINOPHIL NFR BLD AUTO: 3.2 %
ERYTHROCYTE [DISTWIDTH] IN BLOOD BY AUTOMATED COUNT: 12.7 % (ref 10–15)
ERYTHROCYTE [DISTWIDTH] IN BLOOD BY AUTOMATED COUNT: 14.2 %
GFR SERPL CREATININE-BSD FRML MDRD: 85 ML/MIN/{1.73_M2}
GLUCOSE SERPL-MCNC: 123 MG/DL (ref 70–99)
HCT VFR BLD AUTO: 35 %
HCT VFR BLD AUTO: 37.4 % (ref 35–47)
HEMOGLOBIN: 11.1 G/DL
HGB BLD-MCNC: 11.4 G/DL (ref 11.7–15.7)
IMM GRANULOCYTES # BLD: 0 10E9/L (ref 0–0.4)
IMM GRANULOCYTES NFR BLD: 0.2 %
LDH SERPL L TO P-CCNC: 156 U/L (ref 81–234)
LYMPHOCYTES # BLD AUTO: 0.8 #
LYMPHOCYTES # BLD AUTO: 1.1 10E9/L (ref 0.8–5.3)
LYMPHOCYTES NFR BLD AUTO: 18.3 %
LYMPHOCYTES NFR BLD AUTO: 19.1 %
MCH RBC QN AUTO: 31.3 PG
MCH RBC QN AUTO: 31.8 PG (ref 26.5–33)
MCHC RBC AUTO-ENTMCNC: 30.5 G/DL (ref 31.5–36.5)
MCHC RBC AUTO-ENTMCNC: 31.7 G/DL
MCV RBC AUTO: 104 FL (ref 78–100)
MCV RBC AUTO: 99 FL
MONOCYTES # BLD AUTO: 0.3 #
MONOCYTES # BLD AUTO: 0.6 10E9/L (ref 0–1.3)
MONOCYTES NFR BLD AUTO: 8 %
MONOCYTES NFR BLD AUTO: 9.9 %
NEUTROPHILS # BLD AUTO: 3 #
NEUTROPHILS # BLD AUTO: 4 10E9/L (ref 1.6–8.3)
NEUTROPHILS NFR BLD AUTO: 69.2 %
NEUTROPHILS NFR BLD AUTO: 69.4 %
NRBC # BLD AUTO: 0 10*3/UL
NRBC BLD AUTO-RTO: 0 /100
PLATELET # BLD AUTO: 121 10E9/L (ref 150–450)
PLATELET COUNT - QUEST: 115 K/UL (ref 150–450)
POTASSIUM SERPL-SCNC: 4.7 MMOL/L (ref 3.4–5.3)
PROT SERPL-MCNC: 6.8 G/DL (ref 6.8–8.8)
RBC # BLD AUTO: 3.54 M/UL
RBC # BLD AUTO: 3.59 10E12/L (ref 3.8–5.2)
RETIC HEMOGLOBIN: 33.2 PG (ref 28.2–35.7)
RETICS # AUTO: 34.1 10E9/L (ref 25–95)
RETICS/RBC NFR AUTO: 1 % (ref 0.5–2)
SODIUM SERPL-SCNC: 139 MMOL/L (ref 133–144)
URATE SERPL-MCNC: 4 MG/DL (ref 2.6–6)
WBC # BLD AUTO: 4.3 K/UL
WBC # BLD AUTO: 5.8 10E9/L (ref 4–11)

## 2019-01-01 PROCEDURE — G0463 HOSPITAL OUTPT CLINIC VISIT: HCPCS

## 2019-01-01 PROCEDURE — 84550 ASSAY OF BLOOD/URIC ACID: CPT | Performed by: INTERNAL MEDICINE

## 2019-01-01 PROCEDURE — 85025 COMPLETE CBC W/AUTO DIFF WBC: CPT | Performed by: INTERNAL MEDICINE

## 2019-01-01 PROCEDURE — 36415 COLL VENOUS BLD VENIPUNCTURE: CPT

## 2019-01-01 PROCEDURE — 80053 COMPREHEN METABOLIC PANEL: CPT | Performed by: INTERNAL MEDICINE

## 2019-01-01 PROCEDURE — 83615 LACTATE (LD) (LDH) ENZYME: CPT | Performed by: INTERNAL MEDICINE

## 2019-01-01 PROCEDURE — 85046 RETICYTE/HGB CONCENTRATE: CPT | Performed by: INTERNAL MEDICINE

## 2019-01-01 ASSESSMENT — PAIN SCALES - GENERAL: PAINLEVEL: NO PAIN (0)

## 2019-02-11 ENCOUNTER — TRANSFERRED RECORDS (OUTPATIENT)
Dept: HEALTH INFORMATION MANAGEMENT | Facility: CLINIC | Age: 74
End: 2019-02-11

## 2019-02-11 LAB
BASOPHILS # BLD AUTO: 0 10*3/UL
BASOPHILS NFR BLD AUTO: 0.2 %
EOSINOPHIL # BLD AUTO: 0.1 10*3/UL
EOSINOPHIL NFR BLD AUTO: 2.9 %
ERYTHROCYTE [DISTWIDTH] IN BLOOD BY AUTOMATED COUNT: 13.6 %
HCT VFR BLD AUTO: 36.4 %
HEMOGLOBIN: 11.5 G/DL (ref 12–16)
LYMPHOCYTES # BLD AUTO: 1 10*3/UL
LYMPHOCYTES NFR BLD AUTO: 22.6 %
MCH RBC QN AUTO: 31 PG
MCHC RBC AUTO-ENTMCNC: 31.6 G/DL (ref 32–36)
MCV RBC AUTO: 98 FL
MONOCYTES # BLD AUTO: 0.4 10*3/UL
MONOCYTES NFR BLD AUTO: 9 %
NEUTROPHILS # BLD AUTO: 2.9 10*3/UL
NEUTROPHILS NFR BLD AUTO: 65.3 %
PLATELET COUNT - QUEST: 126 10^9/L (ref 150–450)
RBC # BLD AUTO: 3.72 10^12/L (ref 4–5.4)
WBC # BLD AUTO: 4.5 10^9/L

## 2019-10-10 NOTE — NURSING NOTE
Chief Complaint   Patient presents with     Oncology Clinic Visit     Pt is here for a rtn for Lymphoma     Blood Draw     labs drawn via VPT by RN in lab. VS taken. Pt checked in for next appt     Labs collected from venipuncture by RN. Vitals taken. Checked in for appointment(s).    Shilpa LOUIS RN PHN BSN  BMT/Oncology Lab

## 2019-10-10 NOTE — NURSING NOTE
"Oncology Rooming Note    October 10, 2019 4:44 PM   Michelle Baker is a 74 year old female who presents for:    Chief Complaint   Patient presents with     Oncology Clinic Visit     Pt is here for a rtn for Lymphoma     Blood Draw     labs drawn via VPT by RN in lab. VS taken. Pt checked in for next appt     Initial Vitals: Blood Pressure 138/69 (BP Location: Right arm, Patient Position: Sitting, Cuff Size: Adult Regular)   Pulse 72   Temperature 98.4  F (36.9  C) (Oral)   Respiration 16   Weight 33.4 kg (73 lb 11.2 oz)   Oxygen Saturation 99%   Body Mass Index 14.89 kg/m   Estimated body mass index is 14.89 kg/m  as calculated from the following:    Height as of 9/19/18: 1.499 m (4' 11\").    Weight as of this encounter: 33.4 kg (73 lb 11.2 oz). Body surface area is 1.18 meters squared.  No Pain (0) Comment: Data Unavailable   No LMP recorded. Patient has had a hysterectomy.  Allergies reviewed: Yes  Medications reviewed: Yes    Medications: Medication refills not needed today.  Pharmacy name entered into EPIC:    [facility] - [id number] [facility] [place], [place] - [address] [id number] - [place], [place] - [address] [place] [address]    Clinical concerns: none       Michaelle Santos MA              "

## 2019-10-10 NOTE — PROGRESS NOTES
"ONCOLOGY OUTPATIENT NOTE     DOS 10/10/19     ONCOLOGY SUMMARY (Updated): Michelle Baker is a 73-year-old woman with a history of follicular lymphoma diagnosed with stage IVB disease in 10/2011 (scattered nodes, large abdominal mass, involvement and displacement of right kidney, marrow and skeletal involvement.  Initial therapy was with 4 weekly doses of rituximab in 09/2011.  She had a very good response to the rituximab and then subsequently progressed in the spring of 2013.  At this time, a node in the right inguinal region was re-biopsied.  There was no evidence of transformation.  However, because of the small biopsy, it could not be ruled out.  She was re-treated with rituximab in June-early July 2013.  In general, she had a favorable response, but it did not hold.        By 02/2014, she had additional disease with enlarging kim disease in the chest, abdomen and pelvis, and involvement in the skeleton.  There was again a large retroperitoneal mass.  For this, she was treated with R-bendamustine beginning in 02/2014.  She had a total of 5 cycles ending in 06/2014.  An excellent response was identified after the fourth cycle by CT scan.  She did not receive a sixth cycle because of \"late onset neutropenia\" attributed to rituximab (resolved).  A PET/CT scan confirmed significant improvement in the retroperitoneum and at other sites.  Outside CT scan in 03/11/2015 showed complete resolution of the right retroperitoneal mass and other adenopathy.  The most recent CT scan on 05/22/2018 showed progression in retroperitoneal nodes.      Ms. Baker also has very severe chronic obstructive pulmonary disease for which she is on inhalers plus home O2.       INTERVAL HISTORY: Last seen in clinic 12/2018.  She is at her baseline during today's visit.  She feels fatigued but remains as active as she can given COPD and limitations.  O2 needs remain stable at 2 LPM.  She is not having any of her initial symptoms from " presentation including fevers, chills, night sweats, or lymphadenopathy.  Her appetite is good and she is making considerable effort to increase calorie and protein intake.  She otherwise denies complaints and is happy with her current status.       TEN-POINT REVIEW OF SYSTEMS:  Otherwise negative.       MEDICATIONS:   1.  Acetaminophen p.r.n.   2.  Albuterol inhaler QID  3.  Calcium plus vitamin D.   4.  Paroxetine 30mg daily.   5.  Spiriva HandiHaler.      ALLERGIES:  None reported.      PHYSICAL EXAMINATION:   Vital signs:  /69 (BP Location: Right arm, Patient Position: Sitting, Cuff Size: Adult Regular)   Pulse 72   Temp 98.4  F (36.9  C) (Oral)   Resp 16   Wt 33.4 kg (73 lb 11.2 oz)   SpO2 99%   BMI 14.89 kg/m      GENERAL:  Sitting in a wheelchair with nasal O2.  Somewhat hard of hearing.  Cachectic. Pleasant and in no distress.   HEENT:  No icterus.  Oropharynx: OP clear.  Mucosa:  Moist without lesion.   NECK:  No cervical or supraclavicular adenopathy palpable.   CHEST:  Clear to auscultation, normal WOB on 2LPM. Prolonged expiratory phase with diffusely decreased breath sounds.   HEART:  Regular rhythm. Faint systolic murmur at apex only. No gallop or rub.   ABDOMEN:  Soft.  No detectable organomegaly.  No masses.  No tenderness.  Bowel sounds active.   EXTREMITIES:  Trace lower extremity edema b/l.   SKIN:  No rashes. Scattered ecchymoses on b/l forearms.  No open wounds or sores appreciated on exposed areas of skin.      LABORATORY:   CO2 41  Cr 0.71    Uric acid 4.0  WBC 5.8  ANC 4.0  Hgb 11.4    Plt 121     Imaging: No imaging since last encounter.     ASSESSMENT AND PLAN:  Follicular lymphoma (grade 1), stage IVB:  Ms. Baker appears to be doing well at present.  At restaging for 05/2018 appointment, there was enlargement seen in the confluent retroperitoneal lymph nodes.  On the scan from 12/2018, there appears to have been necrosis in that group of nodes with reduction in  overall size of the aggregate.  Other lymph nodes in the abdomen, especially, have also spontaneously regressed slightly in size. The cause of the apparent regression was unclear at the time of 12/2018 appointment.  In the interval, she continues to feel well and denies symptoms congruent with her initial presentation (night sweats, LAD).  She is in agreement to forego additional imaging at this time, but to notify Dr. Uribe and his team immediately should she develop recurrence of B-symptoms.  We will see her back in clinic in 6 months.  Continue to defer imaging unless symptomatic.      Macrocytic anemia.  Hgb remains at baseline, but MCV is gradually increasing to 104fl at 10/2019 visit.  She eats meat and has no known absorption issues.  She is in agreement to start taking a multivitamin and we will reassess her MCV with CBC in 6 months at her return visit.      Patient seen and discussed with Dr. Uribe.     Rakesh Marie MD, PhD  Hematology/Oncology Fellow  Pager: [phone]    The patient was discussed with the clinical fellow, seen and examined by me. All labs and imaging were reviewed. I  I agree with the fellows note and have been responsible for the care plan and interpretation of progress. Any additional information to the fellows note has been documented below.      Michelle is doing well and hence would currently monitor. If she develops symptoms or signs related to lymphomas then we would do imaging.    Mulugeta KEENAN MS  Attending Physician  Pager - [phone]  Email - [email or web address]